# Patient Record
Sex: FEMALE | Race: WHITE | Employment: OTHER | URBAN - METROPOLITAN AREA
[De-identification: names, ages, dates, MRNs, and addresses within clinical notes are randomized per-mention and may not be internally consistent; named-entity substitution may affect disease eponyms.]

---

## 2017-01-10 DIAGNOSIS — F41.9 ANXIETY: Primary | ICD-10-CM

## 2017-01-10 NOTE — TELEPHONE ENCOUNTER
citalopram (CELEXA) 20 MG tablet     Last Written Prescription Date: 7/14/2016  Last Fill Quantity: 90 tablet, # refills: 1  Last Office Visit with [facility] primary care provider:  5/25/2016        Last PHQ-9 score on record=   PHQ-9 SCORE 5/25/2016   Total Score -   Total Score 10

## 2017-01-11 RX ORDER — CITALOPRAM HYDROBROMIDE 20 MG/1
20 TABLET ORAL DAILY
Qty: 90 TABLET | Refills: 1 | Status: SHIPPED
Start: 2017-01-11 | End: 2017-06-05

## 2017-01-11 NOTE — TELEPHONE ENCOUNTER
Dx for medication is anxiety. No depression on problem list. Prescription approved per [facility] Refill Protocol.  Albania Frankel RN, BSN  [facility]

## 2017-05-11 DIAGNOSIS — J44.9 COPD (CHRONIC OBSTRUCTIVE PULMONARY DISEASE) (H): ICD-10-CM

## 2017-05-11 NOTE — TELEPHONE ENCOUNTER
levofloxacin (LEVAQUIN) 500 MG tablet           Last Written Prescription Date: 5-25-16  Last Fill Quantity: 7, # refills: 0    Last Office Visit with [facility] or [facility] prescribing provider:  5-25-16   Future Office Visit:    Next 5 appointments (look out 90 days)     Jun 05, 2017  9:40 AM CDT   PHYSICAL with Karen Weiler, MD   [facility] ([facility])    [address]  [facility] [id number] [phone]                   Lab Results   Component Value Date    WBC 5.0 05/25/2016     Lab Results   Component Value Date    RBC 5.03 05/25/2016     Lab Results   Component Value Date    HGB 14.3 05/25/2016     Lab Results   Component Value Date    HCT 43.1 05/25/2016     No components found for: MCT  Lab Results   Component Value Date    MCV 86 05/25/2016     Lab Results   Component Value Date    MCH 28.4 05/25/2016     Lab Results   Component Value Date    MCHC 33.2 05/25/2016     Lab Results   Component Value Date    RDW 15.5 05/25/2016     Lab Results   Component Value Date     05/25/2016     Lab Results   Component Value Date    AST 20 05/25/2016     Lab Results   Component Value Date    ALT 20 05/25/2016     Creatinine   Date Value Ref Range Status   05/25/2016 0.80 0.52 - 1.04 mg/dL Final   ]

## 2017-05-21 DIAGNOSIS — I10 ESSENTIAL HYPERTENSION WITH GOAL BLOOD PRESSURE LESS THAN 140/90: ICD-10-CM

## 2017-05-22 RX ORDER — LOSARTAN POTASSIUM AND HYDROCHLOROTHIAZIDE 12.5; 5 MG/1; MG/1
TABLET ORAL
Qty: 90 TABLET | Refills: 0 | OUTPATIENT
Start: 2017-05-22

## 2017-05-22 NOTE — TELEPHONE ENCOUNTER
losartan-hydrochlorothiazide (HYZAAR) 50-12.5 MG per tablet      Last Written Prescription Date: 11/23/2016  Last Fill Quantity: 90 tablet, # refills: 1  Last Office Visit with [facility], [facility] or [facility] prescribing provider: 9/29/2016  Next 5 appointments (look out 90 days)     Jun 05, 2017  9:40 AM CDT   PHYSICAL with Karen Weiler, MD   [facility] ([facility]    [address] [facility] [id number] [phone]                   Potassium   Date Value Ref Range Status   05/25/2016 4.1 3.4 - 5.3 mmol/L Final     Creatinine   Date Value Ref Range Status   05/25/2016 0.80 0.52 - 1.04 mg/dL Final     BP Readings from Last 3 Encounters:   05/25/16 138/88   05/13/15 138/68   05/05/15 102/56

## 2017-05-26 NOTE — TELEPHONE ENCOUNTER
Please ask patient if she is currently having symptoms of infection, or if she just wants this medication on-hand in the event of a COPD exacerbation (it looks like she has gotten prescriptions for antibiotic and prednisone in the past for emergency use when traveling).    Afsaneh Boyle PA-C

## 2017-06-05 ENCOUNTER — OFFICE VISIT (OUTPATIENT)
Dept: FAMILY MEDICINE | Facility: CLINIC | Age: 77
End: 2017-06-05
Payer: COMMERCIAL

## 2017-06-05 VITALS
WEIGHT: 138 LBS | DIASTOLIC BLOOD PRESSURE: 80 MMHG | HEIGHT: 70 IN | HEART RATE: 86 BPM | TEMPERATURE: 98.4 F | SYSTOLIC BLOOD PRESSURE: 134 MMHG | OXYGEN SATURATION: 95 % | BODY MASS INDEX: 19.76 KG/M2

## 2017-06-05 DIAGNOSIS — Z13.6 CARDIOVASCULAR SCREENING; LDL GOAL LESS THAN 130: ICD-10-CM

## 2017-06-05 DIAGNOSIS — G25.81 RESTLESS LEG SYNDROME: ICD-10-CM

## 2017-06-05 DIAGNOSIS — F41.9 ANXIETY: ICD-10-CM

## 2017-06-05 DIAGNOSIS — J44.9 CHRONIC OBSTRUCTIVE PULMONARY DISEASE, UNSPECIFIED COPD TYPE (H): ICD-10-CM

## 2017-06-05 DIAGNOSIS — R19.5 LOOSE STOOLS: ICD-10-CM

## 2017-06-05 DIAGNOSIS — Z00.00 MEDICARE ANNUAL WELLNESS VISIT, SUBSEQUENT: Primary | ICD-10-CM

## 2017-06-05 DIAGNOSIS — I10 ESSENTIAL HYPERTENSION WITH GOAL BLOOD PRESSURE LESS THAN 140/90: ICD-10-CM

## 2017-06-05 DIAGNOSIS — Z23 NEED FOR VACCINATION WITH 13-POLYVALENT PNEUMOCOCCAL CONJUGATE VACCINE: ICD-10-CM

## 2017-06-05 DIAGNOSIS — R41.3 MEMORY PROBLEM: ICD-10-CM

## 2017-06-05 DIAGNOSIS — K21.00 REFLUX ESOPHAGITIS: ICD-10-CM

## 2017-06-05 LAB
ALBUMIN SERPL-MCNC: 4.1 G/DL (ref 3.4–5)
ALP SERPL-CCNC: 53 U/L (ref 40–150)
ALT SERPL W P-5'-P-CCNC: 19 U/L (ref 0–50)
ANION GAP SERPL CALCULATED.3IONS-SCNC: 9 MMOL/L (ref 3–14)
AST SERPL W P-5'-P-CCNC: 18 U/L (ref 0–45)
BASOPHILS # BLD AUTO: 0 10E9/L (ref 0–0.2)
BASOPHILS NFR BLD AUTO: 0.6 %
BILIRUB SERPL-MCNC: 0.6 MG/DL (ref 0.2–1.3)
BUN SERPL-MCNC: 15 MG/DL (ref 7–30)
CALCIUM SERPL-MCNC: 9 MG/DL (ref 8.5–10.1)
CHLORIDE SERPL-SCNC: 106 MMOL/L (ref 94–109)
CHOLEST SERPL-MCNC: 214 MG/DL
CO2 SERPL-SCNC: 27 MMOL/L (ref 20–32)
CREAT SERPL-MCNC: 0.84 MG/DL (ref 0.52–1.04)
DIFFERENTIAL METHOD BLD: ABNORMAL
EOSINOPHIL # BLD AUTO: 0.2 10E9/L (ref 0–0.7)
EOSINOPHIL NFR BLD AUTO: 3.6 %
ERYTHROCYTE [DISTWIDTH] IN BLOOD BY AUTOMATED COUNT: 13.5 % (ref 10–15)
FOLATE SERPL-MCNC: 51 NG/ML
GFR SERPL CREATININE-BSD FRML MDRD: 66 ML/MIN/1.7M2
GLUCOSE SERPL-MCNC: 95 MG/DL (ref 70–99)
HCT VFR BLD AUTO: 46.7 % (ref 35–47)
HDLC SERPL-MCNC: 65 MG/DL
HGB BLD-MCNC: 16.1 G/DL (ref 11.7–15.7)
IRON SATN MFR SERPL: 33 % (ref 15–46)
IRON SERPL-MCNC: 108 UG/DL (ref 35–180)
LDLC SERPL CALC-MCNC: 128 MG/DL
LYMPHOCYTES # BLD AUTO: 1.4 10E9/L (ref 0.8–5.3)
LYMPHOCYTES NFR BLD AUTO: 29.8 %
MCH RBC QN AUTO: 30.4 PG (ref 26.5–33)
MCHC RBC AUTO-ENTMCNC: 34.5 G/DL (ref 31.5–36.5)
MCV RBC AUTO: 88 FL (ref 78–100)
MONOCYTES # BLD AUTO: 0.5 10E9/L (ref 0–1.3)
MONOCYTES NFR BLD AUTO: 10.5 %
NEUTROPHILS # BLD AUTO: 2.7 10E9/L (ref 1.6–8.3)
NEUTROPHILS NFR BLD AUTO: 55.5 %
NONHDLC SERPL-MCNC: 149 MG/DL
PLATELET # BLD AUTO: 293 10E9/L (ref 150–450)
POTASSIUM SERPL-SCNC: 4 MMOL/L (ref 3.4–5.3)
PROT SERPL-MCNC: 7.1 G/DL (ref 6.8–8.8)
RBC # BLD AUTO: 5.3 10E12/L (ref 3.8–5.2)
SODIUM SERPL-SCNC: 142 MMOL/L (ref 133–144)
TIBC SERPL-MCNC: 331 UG/DL (ref 240–430)
TRIGL SERPL-MCNC: 105 MG/DL
TSH SERPL DL<=0.005 MIU/L-ACNC: 1.16 MU/L (ref 0.4–4)
VIT B12 SERPL-MCNC: 191 PG/ML (ref 193–986)
WBC # BLD AUTO: 4.8 10E9/L (ref 4–11)

## 2017-06-05 PROCEDURE — 82607 VITAMIN B-12: CPT | Performed by: FAMILY MEDICINE

## 2017-06-05 PROCEDURE — 80061 LIPID PANEL: CPT | Performed by: FAMILY MEDICINE

## 2017-06-05 PROCEDURE — 83540 ASSAY OF IRON: CPT | Performed by: FAMILY MEDICINE

## 2017-06-05 PROCEDURE — 82746 ASSAY OF FOLIC ACID SERUM: CPT | Performed by: FAMILY MEDICINE

## 2017-06-05 PROCEDURE — 80050 GENERAL HEALTH PANEL: CPT | Performed by: FAMILY MEDICINE

## 2017-06-05 PROCEDURE — 99397 PER PM REEVAL EST PAT 65+ YR: CPT | Mod: 25 | Performed by: FAMILY MEDICINE

## 2017-06-05 PROCEDURE — 83550 IRON BINDING TEST: CPT | Performed by: FAMILY MEDICINE

## 2017-06-05 PROCEDURE — 36415 COLL VENOUS BLD VENIPUNCTURE: CPT | Performed by: FAMILY MEDICINE

## 2017-06-05 PROCEDURE — 90471 IMMUNIZATION ADMIN: CPT | Performed by: FAMILY MEDICINE

## 2017-06-05 PROCEDURE — 90670 PCV13 VACCINE IM: CPT | Performed by: FAMILY MEDICINE

## 2017-06-05 RX ORDER — PRAMIPEXOLE DIHYDROCHLORIDE 0.25 MG/1
0.25 TABLET ORAL 2 TIMES DAILY
Qty: 180 TABLET | Refills: 1 | Status: SHIPPED | OUTPATIENT
Start: 2017-06-05 | End: 2017-12-03

## 2017-06-05 RX ORDER — LEVOFLOXACIN 500 MG/1
500 TABLET, FILM COATED ORAL DAILY
Qty: 7 TABLET | Refills: 0 | Status: SHIPPED | OUTPATIENT
Start: 2017-06-05 | End: 2018-06-07

## 2017-06-05 RX ORDER — CITALOPRAM HYDROBROMIDE 20 MG/1
20 TABLET ORAL DAILY
Qty: 90 TABLET | Refills: 1 | Status: SHIPPED | OUTPATIENT
Start: 2017-06-05 | End: 2017-12-17

## 2017-06-05 RX ORDER — GABAPENTIN 300 MG/1
300 CAPSULE ORAL AT BEDTIME
Qty: 90 CAPSULE | Refills: 1 | Status: SHIPPED | OUTPATIENT
Start: 2017-06-05 | End: 2017-11-12

## 2017-06-05 RX ORDER — PREDNISONE 10 MG/1
TABLET ORAL
Qty: 30 TABLET | Refills: 2 | Status: SHIPPED | OUTPATIENT
Start: 2017-06-05 | End: 2018-06-07

## 2017-06-05 RX ORDER — LOSARTAN POTASSIUM AND HYDROCHLOROTHIAZIDE 12.5; 5 MG/1; MG/1
1 TABLET ORAL DAILY
Qty: 90 TABLET | Refills: 1 | Status: SHIPPED | OUTPATIENT
Start: 2017-06-05 | End: 2017-12-03

## 2017-06-05 NOTE — NURSING NOTE
"Chief Complaint   Patient presents with     Wellness Visit       Initial /80  Pulse 86  Temp 98.4  F (36.9  C) (Oral)  Ht 5' 9.9\" (1.775 m)  Wt 138 lb (62.6 kg)  SpO2 95%  BMI 19.86 kg/m2 Estimated body mass index is 19.86 kg/(m^2) as calculated from the following:    Height as of this encounter: 5' 9.9\" (1.775 m).    Weight as of this encounter: 138 lb (62.6 kg).  Medication Reconciliation: complete   Cherry Jeong Certified Medical Assistant      "

## 2017-06-05 NOTE — PATIENT INSTRUCTIONS

## 2017-06-05 NOTE — MR AVS SNAPSHOT
After Visit Summary   6/5/2017    Katty Hayward    MRN: [id number]           Patient Information     Date Of Birth          1940        Visit Information        Provider Department      6/5/2017 9:40 AM Weiler, Karen, MD [facility]        Today's Diagnoses     Medicare annual wellness visit, subsequent    -  1    Loose stools        CARDIOVASCULAR SCREENING; LDL GOAL LESS THAN 130        Chronic obstructive pulmonary disease, unspecified COPD type (H)        Memory problem        Restless leg syndrome        Essential hypertension with goal blood pressure less than 140/90        Anxiety        Reflux esophagitis        Need for vaccination with 13-polyvalent pneumococcal conjugate vaccine          Care Instructions      Preventive Health Recommendations    Female Ages 65 +    Yearly exam:     See your health care provider every year in order to  o Review health changes.   o Discuss preventive care.    o Review your medicines if your doctor has prescribed any.      You no longer need a yearly Pap test unless you've had an abnormal Pap test in the past 10 years. If you have vaginal symptoms, such as bleeding or discharge, be sure to talk with your provider about a Pap test.      Every 1 to 2 years, have a mammogram.  If you are over 69, talk with your health care provider about whether or not you want to continue having screening mammograms.      Every 10 years, have a colonoscopy. Or, have a yearly FIT test (stool test). These exams will check for colon cancer.       Have a cholesterol test every 5 years, or more often if your doctor advises it.       Have a diabetes test (fasting glucose) every three years. If you are at risk for diabetes, you should have this test more often.       At age 65, have a bone density scan (DEXA) to check for osteoporosis (brittle bone disease).    Shots:    Get a flu shot each year.    Get a tetanus shot every 10 years.    Talk to your doctor about your  pneumonia vaccines. There are now two you should receive - Pneumovax (PPSV 23) and Prevnar (PCV 13).    Talk to your doctor about the shingles vaccine.    Talk to your doctor about the hepatitis B vaccine.    Nutrition:     Eat at least 5 servings of fruits and vegetables each day.      Eat whole-grain bread, whole-wheat pasta and brown rice instead of white grains and rice.      Talk to your provider about Calcium and Vitamin D.     Lifestyle    Exercise at least 150 minutes a week (30 minutes a day, 5 days a week). This will help you control your weight and prevent disease.      Limit alcohol to one drink per day.      No smoking.       Wear sunscreen to prevent skin cancer.       See your dentist twice a year for an exam and cleaning.      See your eye doctor every 1 to 2 years to screen for conditions such as glaucoma, macular degeneration and cataracts.    Try taking Immodium as needed.  For example, when you go out to eat.           Follow-ups after your visit        Future tests that were ordered for you today     Open Future Orders        Priority Expected Expires Ordered    Enteric Bacteria and Virus Panel by NATHANIEL Stool Routine  6/5/2018 6/5/2017    Clostridium difficile Toxin B PCR Routine  12/5/2017 6/5/2017            Who to contact     If you have questions or need follow up information about today's clinic visit or your schedule please contact [facility] directly at [phone].  Normal or non-critical lab and imaging results will be communicated to you by MyChart, letter or phone within 4 business days after the clinic has received the results. If you do not hear from us within 7 days, please contact the clinic through MyChart or phone. If you have a critical or abnormal lab result, we will notify you by phone as soon as possible.  Submit refill requests through [employer] or call your pharmacy and they will forward the refill request to us. Please allow 3 business days for your refill to be  "completed.          Additional Information About Your Visit        [employer] Information     [employer] gives you secure access to your electronic health record. If you see a primary care provider, you can also send messages to your care team and make appointments. If you have questions, please call your primary care clinic.  If you do not have a primary care provider, please call [phone] and they will assist you.        Care EveryWhere ID     This is your Care EveryWhere ID. This could be used by other organizations to access your [place] medical records  [id number]        Your Vitals Were     Pulse Temperature Height Pulse Oximetry BMI (Body Mass Index)       86 98.4  F (36.9  C) (Oral) 5' 9.9\" (1.775 m) 95% 19.86 kg/m2        Blood Pressure from Last 3 Encounters:   06/05/17 134/80   05/25/16 138/88   05/13/15 138/68    Weight from Last 3 Encounters:   06/05/17 138 lb (62.6 kg)   09/13/16 135 lb (61.2 kg)   09/08/16 135 lb (61.2 kg)              We Performed the Following     CBC with platelets and differential     Comprehensive metabolic panel (BMP + Alb, Alk Phos, ALT, AST, Total. Bili, TP)     Folate     Iron and iron binding capacity     Lipid panel reflex to direct LDL     PNEUMOCOCCAL CONJ VACCINE 13 VALENT IM     TSH with free T4 reflex     Vitamin B12          Today's Medication Changes          These changes are accurate as of: 6/5/17 10:33 AM.  If you have any questions, ask your nurse or doctor.               Start taking these medicines.        Dose/Directions    gabapentin 300 MG capsule   Commonly known as:  NEURONTIN   Used for:  Restless leg syndrome   Started by:  Weiler, Karen, MD        Dose:  300 mg   Take 1 capsule (300 mg) by mouth At Bedtime   Quantity:  90 capsule   Refills:  1       levofloxacin 500 MG tablet   Commonly known as:  LEVAQUIN   Used for:  Chronic obstructive pulmonary disease, unspecified COPD type (H)   Started by:  Weiler, Karen, MD        Dose:  500 mg   Take 1 tablet " (500 mg) by mouth daily   Quantity:  7 tablet   Refills:  0         Stop taking these medicines if you haven't already. Please contact your care team if you have questions.     budesonide-formoterol 80-4.5 MCG/ACT Inhaler   Commonly known as:  SYMBICORT   Stopped by:  Weiler, Karen, MD           clonazePAM 0.5 MG tablet   Commonly known as:  klonoPIN   Stopped by:  Weiler, Karen, MD                Where to get your medicines      Some of these will need a paper prescription and others can be bought over the counter.  Ask your nurse if you have questions.     Bring a paper prescription for each of these medications     citalopram 20 MG tablet    gabapentin 300 MG capsule    levofloxacin 500 MG tablet    losartan-hydrochlorothiazide 50-12.5 MG per tablet    omeprazole 20 MG CR capsule    pramipexole 0.25 MG tablet    predniSONE 10 MG tablet                Primary Care Provider Office Phone # Fax #    Karen Weiler, [phone]       [facility] [address] [address]        Thank you!     Thank you for choosing [facility]  for your care. Our goal is always to provide you with excellent care. Hearing back from our patients is one way we can continue to improve our services. Please take a few minutes to complete the written survey that you may receive in the mail after your visit with us. Thank you!             Your Updated Medication List - Protect others around you: Learn how to safely use, store and throw away your medicines at www.disposemymeds.org.          This list is accurate as of: 6/5/17 10:33 AM.  Always use your most recent med list.                   Brand Name Dispense Instructions for use    aspirin 81 MG tablet     100    ONE DAILY       BREO ELLIPTA 100-25 MCG/INH oral inhaler   Generic drug:  fluticasone-vilanterol          calcium 600 MG tablet     100 tablet    Take 1 tablet by mouth daily.       citalopram 20 MG tablet    celeXA    90 tablet     Take 1 tablet (20 mg) by mouth daily       gabapentin 300 MG capsule    NEURONTIN    90 capsule    Take 1 capsule (300 mg) by mouth At Bedtime       * Ipratropium-Albuterol  MCG/ACT inhaler    COMBIVENT RESPIMAT    3 Inhaler    Inhale 1 puff into the lungs 4 times daily Not to exceed 6 doses per day.       * ipratropium - albuterol 0.5 mg/2.5 mg/3 mL 0.5-2.5 (3) MG/3ML neb solution    DUONEB    90 vial    Take 1 vial (3 mLs) by nebulization every 6 hours as needed for shortness of breath / dyspnea or wheezing       levofloxacin 500 MG tablet    LEVAQUIN    7 tablet    Take 1 tablet (500 mg) by mouth daily       losartan-hydrochlorothiazide 50-12.5 MG per tablet    HYZAAR    90 tablet    Take 1 tablet by mouth daily       Multiple vitamin  s Tabs          omeprazole 20 MG CR capsule    priLOSEC    180 capsule    Take 1 capsule (20 mg) by mouth 2 times daily       order for DME     1 Device    Nebulizer Machine       * pramipexole 0.5 MG tablet    MIRAPEX    90 tablet    Take 1 tablet (0.5 mg) by mouth At Bedtime       * pramipexole 0.25 MG tablet    MIRAPEX    180 tablet    Take 1 tablet (0.25 mg) by mouth 2 times daily Must be seen in clinic for further refills       predniSONE 10 MG tablet    DELTASONE    30 tablet    Take 4 Tablets by mouth every 2 days then decrease by 1 tablet every 3 rd day until off       vitamin D 2000 UNITS tablet     100 tablet    Take 1 tablet by mouth daily.       * Notice:  This list has 4 medication(s) that are the same as other medications prescribed for you. Read the directions carefully, and ask your doctor or other care provider to review them with you.

## 2017-06-07 DIAGNOSIS — E53.8 VITAMIN B12 DEFICIENCY (NON ANEMIC): Primary | ICD-10-CM

## 2017-06-07 RX ORDER — CYANOCOBALAMIN 1000 UG/ML
1 INJECTION, SOLUTION INTRAMUSCULAR; SUBCUTANEOUS
Qty: 1 ML | Refills: 11 | OUTPATIENT
Start: 2017-06-07 | End: 2018-06-07

## 2017-06-09 RX ORDER — LEVOFLOXACIN 500 MG/1
TABLET, FILM COATED ORAL
Qty: 7 TABLET | Refills: 0 | OUTPATIENT
Start: 2017-06-09

## 2017-06-19 ENCOUNTER — ALLIED HEALTH/NURSE VISIT (OUTPATIENT)
Dept: NURSING | Facility: CLINIC | Age: 77
End: 2017-06-19
Payer: COMMERCIAL

## 2017-06-19 DIAGNOSIS — E53.8 VITAMIN B12 DEFICIENCY (NON ANEMIC): Primary | ICD-10-CM

## 2017-06-19 PROCEDURE — 96372 THER/PROPH/DIAG INJ SC/IM: CPT

## 2017-07-25 ENCOUNTER — TELEPHONE (OUTPATIENT)
Dept: FAMILY MEDICINE | Facility: CLINIC | Age: 77
End: 2017-07-25

## 2017-07-25 DIAGNOSIS — R19.5 LOOSE STOOLS: ICD-10-CM

## 2017-07-25 LAB
C DIFF TOX B STL QL: NORMAL
CAMPYLOBACTER GROUP BY NAT: NOT DETECTED
ENTERIC PATHOGEN COMMENT: NORMAL
NOROVIRUS I AND II BY NAT: NOT DETECTED
ROTAVIRUS A BY NAT: NOT DETECTED
SALMONELLA SPECIES BY NAT: NOT DETECTED
SHIGA TOXIN 1 GENE BY NAT: NOT DETECTED
SHIGA TOXIN 2 GENE BY NAT: NOT DETECTED
SHIGELLA SP+EIEC IPAH STL QL NAA+PROBE: NOT DETECTED
SPECIMEN SOURCE: NORMAL
VIBRIO GROUP BY NAT: NOT DETECTED
YERSINIA ENTEROCOLITICA BY NAT: NOT DETECTED

## 2017-07-25 PROCEDURE — 87493 C DIFF AMPLIFIED PROBE: CPT | Performed by: FAMILY MEDICINE

## 2017-07-25 PROCEDURE — 87506 IADNA-DNA/RNA PROBE TQ 6-11: CPT | Performed by: FAMILY MEDICINE

## 2017-07-25 NOTE — TELEPHONE ENCOUNTER
Name of caller: Lillie  Relationship of Patient: Self    Reason for Call: Patient came into clinic to ask how often she should be getting her B12 shot and had questions about it as well.     Best phone number to reach pt at is: [phone]  Ok to leave a message with medical info? Yes    Pharmacy preferred (if calling for a refill): ROBERT Neumann  [facility]-Patient Representative

## 2017-07-25 NOTE — TELEPHONE ENCOUNTER
Routing to TC- Lab note:   Her Vitamin B12 level also came back low. Dr. Weiler recommends starting monthly Vitamin B12 injections in clinic to treat the deficiency. Please assist with scheduling appt on MA schedule.  Silvia Lackey RN- Triage [employer]

## 2017-07-26 NOTE — TELEPHONE ENCOUNTER
Called number below and left detailed message for pt.  Asked her to call when ready to schedule her MA only appt.  Jacquelyn Motley

## 2017-07-31 ENCOUNTER — ALLIED HEALTH/NURSE VISIT (OUTPATIENT)
Dept: NURSING | Facility: CLINIC | Age: 77
End: 2017-07-31
Payer: COMMERCIAL

## 2017-07-31 DIAGNOSIS — E53.8 VITAMIN B12 DEFICIENCY (NON ANEMIC): Primary | ICD-10-CM

## 2017-07-31 PROCEDURE — 96372 THER/PROPH/DIAG INJ SC/IM: CPT

## 2017-09-05 ENCOUNTER — ALLIED HEALTH/NURSE VISIT (OUTPATIENT)
Dept: NURSING | Facility: CLINIC | Age: 77
End: 2017-09-05
Payer: COMMERCIAL

## 2017-09-05 DIAGNOSIS — E53.8 VITAMIN B12 DEFICIENCY (NON ANEMIC): Primary | ICD-10-CM

## 2017-09-05 PROCEDURE — 96372 THER/PROPH/DIAG INJ SC/IM: CPT

## 2017-09-26 ENCOUNTER — TELEPHONE (OUTPATIENT)
Dept: FAMILY MEDICINE | Facility: CLINIC | Age: 77
End: 2017-09-26

## 2017-09-26 DIAGNOSIS — E53.8 VITAMIN B12 DEFICIENCY (NON ANEMIC): Primary | ICD-10-CM

## 2017-09-26 NOTE — TELEPHONE ENCOUNTER
Please call  Patient and let her know usually patients need to be on B12 shots indefinitely. She needs to take them in [place].        Karen Weiler, MD

## 2017-09-26 NOTE — TELEPHONE ENCOUNTER
Reason for Call:  Other Pt Questions    Detailed comments: Pt called asking about her B12 shots, how long she should continue them. If she would need to do so while she is in [place] for the winter.      Phone Number Patient can be reached at: Home number on file [phone] (home)    Best Time: anytime    Can we leave a detailed message on this number? YES    Call taken on 9/26/2017 at 2:32 PM by Soraya Donato

## 2017-09-27 RX ORDER — CYANOCOBALAMIN 1000 UG/ML
1 INJECTION, SOLUTION INTRAMUSCULAR; SUBCUTANEOUS
Qty: 1 ML | Refills: 11 | Status: SHIPPED | OUTPATIENT
Start: 2017-09-27 | End: 2018-06-29

## 2017-09-27 NOTE — TELEPHONE ENCOUNTER
I called and spoke with Katty information below given--wants sent to [facility]. Leaving October 24th. Katty wants the RX sent in now. She will come to the clinic here on 10/5/17 and get her B12 injection. [facility] with [place] address so our sending now should not be an issue.     Her daughter is RN and will be able to give her injections in [place].     Dr. Weiler please fill RX. Thank you,     Zuleyma Crain R.N.

## 2017-10-03 ENCOUNTER — TRANSFERRED RECORDS (OUTPATIENT)
Dept: HEALTH INFORMATION MANAGEMENT | Facility: CLINIC | Age: 77
End: 2017-10-03

## 2017-10-05 ENCOUNTER — ALLIED HEALTH/NURSE VISIT (OUTPATIENT)
Dept: NURSING | Facility: CLINIC | Age: 77
End: 2017-10-05
Payer: COMMERCIAL

## 2017-10-05 ENCOUNTER — TELEPHONE (OUTPATIENT)
Dept: FAMILY MEDICINE | Facility: CLINIC | Age: 77
End: 2017-10-05

## 2017-10-05 DIAGNOSIS — E53.8 VITAMIN B12 DEFICIENCY (NON ANEMIC): Primary | ICD-10-CM

## 2017-10-05 PROCEDURE — 96372 THER/PROPH/DIAG INJ SC/IM: CPT

## 2017-10-05 NOTE — TELEPHONE ENCOUNTER
Pt as in clinic for B12 injection and let the MA know she was given her vials of B12, but no needles to inject the medication.  Can we send rx for needles to mail order pharmacy?  See pended pahramcy please.  Jacquelyn Motley

## 2017-11-12 DIAGNOSIS — G25.81 RESTLESS LEG SYNDROME: ICD-10-CM

## 2017-11-13 NOTE — TELEPHONE ENCOUNTER
gabapentin      Last Written Prescription Date:  6/5/17  Last Fill Quantity: 90,   # refills: 1  Last Office visit: 6/5/17      Routing refill request to provider for review/approval because:  Drug not on the [facility], P or [facility] refill protocol or controlled substance  Albania Frankel RN, BSN  [facility]

## 2017-11-15 RX ORDER — GABAPENTIN 300 MG/1
CAPSULE ORAL
Qty: 90 CAPSULE | Refills: 1 | Status: SHIPPED | OUTPATIENT
Start: 2017-11-15 | End: 2018-06-29

## 2017-12-03 DIAGNOSIS — G25.81 RESTLESS LEG SYNDROME: ICD-10-CM

## 2017-12-03 DIAGNOSIS — I10 ESSENTIAL HYPERTENSION WITH GOAL BLOOD PRESSURE LESS THAN 140/90: ICD-10-CM

## 2017-12-04 RX ORDER — LOSARTAN POTASSIUM AND HYDROCHLOROTHIAZIDE 12.5; 5 MG/1; MG/1
TABLET ORAL
Qty: 30 TABLET | Refills: 0 | Status: SHIPPED | OUTPATIENT
Start: 2017-12-04 | End: 2018-06-29

## 2017-12-04 RX ORDER — PRAMIPEXOLE DIHYDROCHLORIDE 0.25 MG/1
TABLET ORAL
Qty: 60 TABLET | Refills: 0 | Status: SHIPPED | OUTPATIENT
Start: 2017-12-04

## 2017-12-04 NOTE — TELEPHONE ENCOUNTER
Requested Prescriptions   Pending Prescriptions Disp Refills     pramipexole (MIRAPEX) 0.25 MG tablet [Pharmacy Med Name: PRAMIPEXOLE DIHYDRO TABS 0.25MG]  Last Written Prescription Date:  6/5/2017  Last Fill Quantity: 180 tablet,  # refills: 1   Last Office Visit with [facility], [facility] or [facility] prescribing provider:  6/5/2017   Future Office Visit:      180 tablet 1     Sig: TAKE 1 TABLET TWICE A DAY (NEED TO BE SEEN FOR FURTHER REFILLS)    Antiparkinson's Agents Protocol Passed    12/3/2017  4:30 AM       Passed - Blood pressure under 140/90    BP Readings from Last 3 Encounters:   06/05/17 134/80   05/25/16 138/88   05/13/15 138/68          Passed - Recent or future visit with authorizing provider's specialty    Patient had office visit in the last year or has a visit in the next 30 days with authorizing provider.  See chart review.          Passed - Patient is age 18 or older       Passed - No active pregnancy on record       Passed - No positive pregnancy test in the past 12 months                losartan-hydrochlorothiazide (HYZAAR) 50-12.5 MG per tablet [Pharmacy Med Name: LOSARTAN/HCTZ TABS 50/12.5]  Last Written Prescription Date:  6/5/2017  Last Fill Quantity: 90 tablet,  # refills: 1   Last Office Visit with [facility], [facility] or [facility] prescribing provider:  6/5/2017   Future Office Visit:      90 tablet 1     Sig: TAKE 1 TABLET DAILY    Angiotensin-II Receptors Passed    12/3/2017  4:30 AM       Passed - Blood pressure under 140/90 in past 12 months.    BP Readings from Last 3 Encounters:   06/05/17 134/80   05/25/16 138/88   05/13/15 138/68                Passed - Recent or future visit with authorizing provider's specialty    Patient had office visit in the last year or has a visit in the next 30 days with authorizing provider.  See chart review.              Passed - Patient is age 18 or older       Passed - No active pregnancy on record       Passed - Normal serum creatinine on file in past 12 months    Recent  Labs   Lab Test  06/05/17   1044   CR  0.84            Passed - Normal serum potassium on file in past 12 months    Recent Labs   Lab Test  06/05/17   1044   POTASSIUM  4.0                   Passed - No positive pregnancy test in past 12 months

## 2017-12-04 NOTE — TELEPHONE ENCOUNTER
Medication is being filled for 1 time refill only due to:  Patient needs to be seen because follow up and establish care.     Will route to  to call patient to set up office visit to establish care and for continued refills. Zuleyma Crain R.N.

## 2017-12-05 NOTE — TELEPHONE ENCOUNTER
Reason for Call:  Other returning call    Detailed comments: Pt calling stating not going to get mirapex filled and will see Dr Emanuel Carver as her pcp    Phone Number Patient can be reached at: Cell number on file:    Telephone Information:   Mobile [phone]       Best Time: anytime     Can we leave a detailed message on this number? YES    Call taken on 12/5/2017 at 3:58 PM by Jessie Mendenhall

## 2017-12-05 NOTE — TELEPHONE ENCOUNTER
Called [phone] and spoke with pt.  She is in [place] for the winter.  She does not have anyone there that she sees.  Can someone else fill this?  She will be back in [place] in April.  Please advise.  Jacquelyn Motley

## 2017-12-12 ENCOUNTER — TELEPHONE (OUTPATIENT)
Dept: FAMILY MEDICINE | Facility: CLINIC | Age: 77
End: 2017-12-12

## 2017-12-12 NOTE — TELEPHONE ENCOUNTER
and patient called to ask if patient can establish care with Dr. Carver.  RN advised that she will need to schedule office visit to establish care when they are back in [place].  They verbalized understanding and agreed with plan.    FAIZAN Gold, RN, N  [facility]) [phone]

## 2017-12-17 DIAGNOSIS — F41.9 ANXIETY: ICD-10-CM

## 2017-12-18 RX ORDER — CITALOPRAM HYDROBROMIDE 20 MG/1
TABLET ORAL
Qty: 90 TABLET | Refills: 0 | Status: SHIPPED | OUTPATIENT
Start: 2017-12-18 | End: 2018-06-29

## 2017-12-18 NOTE — TELEPHONE ENCOUNTER
Medication is being filled for 1 time refill only due to:  Patient needs to be seen because needs anxiety follow up and to establish care.   Albania Frankel RN, BSN  [facility]

## 2017-12-18 NOTE — TELEPHONE ENCOUNTER
Requested Prescriptions   Pending Prescriptions Disp Refills     citalopram (CELEXA) 20 MG tablet [Pharmacy Med Name: CITALOPRAM HBR TABS 20MG]  Last Written Prescription Date:  6/5/2017  Last Fill Quantity: 90 tablet,  # refills: 1   Last Office Visit with [facility], [facility] or [facility] prescribing provider:  6/5/2017 (Weiler)   Future Office Visit:     90 tablet 1     Sig: TAKE 1 TABLET DAILY    SSRIs Protocol Passed    12/17/2017  5:13 AM  PHQ-9 SCORE 9/29/2011 10/23/2013 5/25/2016   Total Score 0 1 -   Total Score - - 10     ILDEFONSO-7 SCORE 10/23/2013 11/26/2014 5/25/2016   Total Score 0 16 -   Total Score - 16 -   Total Score - - 6            Passed - Recent or future visit with authorizing provider    Patient had office visit in the last year or has a visit in the next 30 days with authorizing provider.  See chart review.          Passed - Medication is NOT Bupropion    If the medication is Bupropion (Wellbutrin), and the patient is taking for smoking cessation; OK to refill.         Passed - Patient is age 18 or older       Passed - No active pregnancy on record       Passed - No positive pregnancy test in last 12 months

## 2018-06-06 NOTE — PATIENT INSTRUCTIONS
Shingrix    Consider mammogram, DEXA, Colonoscopy    Cetaphil/cerave skin lotion      [facility]                        To reach your care team during and after hours:   [phone]  To reach our pharmacy:        [phone]    Clinic Hours                        Our clinic hours are:    Monday   7:30 am to 7:00 pm                  Tuesday through Friday 7:30 am to 5:00 pm                             Saturday   8:00 am to 12:00 pm      Sunday   Closed      Pharmacy Hours                        Our pharmacy hours are:    Monday   8:30 am to 7:00 pm       Tuesday to Friday  8:30 am to 6:00 pm                       Saturday    9:00 am to 1:00 pm              Sunday    Closed              There is also information available at our web site:  [email or web address]    If your provider ordered any lab tests and you do not receive the results within 10 business days, please call the clinic.    If you need a medication refill please contact your pharmacy.  Please allow 2-3 business days for your refill to be completed.    Our clinic offers telephone visits and e visits.  Please ask one of your team members to explain more.      Use Glassdoor (secure email communication and access to your chart) to send your primary care provider a message or make an appointment. Ask someone on your Team how to sign up for Glassdoor.  Immunizations                      Immunization History   Administered Date(s) Administered     HepB-Adult 01/01/1990, 02/01/1990, 06/01/1990     Influenza (High Dose) 3 valent vaccine 10/03/2011, 11/12/2012, 10/23/2013     Influenza (IIV3) PF 11/03/2004, 10/31/2005, 10/08/2006, 11/01/2007, 11/04/2008, 12/02/2009, 09/21/2010     Influenza Vaccine IM 3yrs+ 4 Valent IIV4 10/01/2017     Pneumo Conj 13-V (2010&after) 06/05/2017     Pneumococcal 23 valent 09/29/2004, 05/12/2010        Health Maintenance                         Health Maintenance Due   Topic Date Due     Osteoporosis Screening (Dexa) -  every 3 years   05/01/2016     COPD ACTION PLAN Q1 YR  05/05/2016     Discuss Advance Directive Planning  08/05/2016     Microalbumin Lab - yearly  05/25/2017     Cholesterol Lab - yearly  06/05/2018     Medicare Annual Wellness Visit  06/05/2018     FALL RISK ASSESSMENT  06/05/2018     Wellness Visit with your Primary Provider - yearly  06/05/2018         Preventive Health Recommendations    Female Ages 65 +    Yearly exam:     See your health care provider every year in order to  o Review health changes.   o Discuss preventive care.    o Review your medicines if your doctor has prescribed any.      You no longer need a yearly Pap test unless you've had an abnormal Pap test in the past 10 years. If you have vaginal symptoms, such as bleeding or discharge, be sure to talk with your provider about a Pap test.      Every 1 to 2 years, have a mammogram.  If you are over 69, talk with your health care provider about whether or not you want to continue having screening mammograms.      Every 10 years, have a colonoscopy. Or, have a yearly FIT test (stool test). These exams will check for colon cancer.       Have a cholesterol test every 5 years, or more often if your doctor advises it.       Have a diabetes test (fasting glucose) every three years. If you are at risk for diabetes, you should have this test more often.       At age 65, have a bone density scan (DEXA) to check for osteoporosis (brittle bone disease).    Shots:    Get a flu shot each year.    Get a tetanus shot every 10 years.    Talk to your doctor about your pneumonia vaccines. There are now two you should receive - Pneumovax (PPSV 23) and Prevnar (PCV 13).    Talk to your doctor about the shingles vaccine.    Talk to your doctor about the hepatitis B vaccine.    Nutrition:     Eat at least 5 servings of fruits and vegetables each day.      Eat whole-grain bread, whole-wheat pasta and brown rice instead of white grains and rice.      Talk to your provider  about Calcium and Vitamin D.     Lifestyle    Exercise at least 150 minutes a week (30 minutes a day, 5 days a week). This will help you control your weight and prevent disease.      Limit alcohol to one drink per day.      No smoking.       Wear sunscreen to prevent skin cancer.       See your dentist twice a year for an exam and cleaning.      See your eye doctor every 1 to 2 years to screen for conditions such as glaucoma, macular degeneration and cataracts.

## 2018-06-07 ENCOUNTER — OFFICE VISIT (OUTPATIENT)
Dept: FAMILY MEDICINE | Facility: CLINIC | Age: 78
End: 2018-06-07
Payer: COMMERCIAL

## 2018-06-07 VITALS
HEART RATE: 74 BPM | BODY MASS INDEX: 20.4 KG/M2 | OXYGEN SATURATION: 97 % | TEMPERATURE: 97.6 F | WEIGHT: 130 LBS | HEIGHT: 67 IN | SYSTOLIC BLOOD PRESSURE: 146 MMHG | DIASTOLIC BLOOD PRESSURE: 96 MMHG

## 2018-06-07 DIAGNOSIS — Z12.39 SCREENING FOR BREAST CANCER: ICD-10-CM

## 2018-06-07 DIAGNOSIS — R41.3 MEMORY CHANGES: ICD-10-CM

## 2018-06-07 DIAGNOSIS — Z51.81 MEDICATION MONITORING ENCOUNTER: ICD-10-CM

## 2018-06-07 DIAGNOSIS — M15.0 PRIMARY OSTEOARTHRITIS INVOLVING MULTIPLE JOINTS: ICD-10-CM

## 2018-06-07 DIAGNOSIS — R82.90 NONSPECIFIC FINDING ON EXAMINATION OF URINE: ICD-10-CM

## 2018-06-07 DIAGNOSIS — I10 ESSENTIAL HYPERTENSION WITH GOAL BLOOD PRESSURE LESS THAN 140/90: ICD-10-CM

## 2018-06-07 DIAGNOSIS — J44.9 CHRONIC OBSTRUCTIVE PULMONARY DISEASE, UNSPECIFIED COPD TYPE (H): ICD-10-CM

## 2018-06-07 DIAGNOSIS — F41.9 ANXIETY: ICD-10-CM

## 2018-06-07 DIAGNOSIS — E53.8 VITAMIN B12 DEFICIENCY (NON ANEMIC): ICD-10-CM

## 2018-06-07 DIAGNOSIS — Z12.11 SCREEN FOR COLON CANCER: ICD-10-CM

## 2018-06-07 DIAGNOSIS — Z13.6 CARDIOVASCULAR SCREENING; LDL GOAL LESS THAN 130: ICD-10-CM

## 2018-06-07 DIAGNOSIS — Z13.820 SCREENING FOR OSTEOPOROSIS: ICD-10-CM

## 2018-06-07 DIAGNOSIS — Z00.00 ENCOUNTER FOR ROUTINE ADULT HEALTH EXAMINATION WITHOUT ABNORMAL FINDINGS: Primary | ICD-10-CM

## 2018-06-07 LAB
ALBUMIN SERPL-MCNC: 4.1 G/DL (ref 3.4–5)
ALBUMIN UR-MCNC: ABNORMAL MG/DL
ALP SERPL-CCNC: 46 U/L (ref 40–150)
ALT SERPL W P-5'-P-CCNC: 21 U/L (ref 0–50)
ANION GAP SERPL CALCULATED.3IONS-SCNC: 8 MMOL/L (ref 3–14)
APPEARANCE UR: CLEAR
AST SERPL W P-5'-P-CCNC: 18 U/L (ref 0–45)
BACTERIA #/AREA URNS HPF: ABNORMAL /HPF
BILIRUB SERPL-MCNC: 0.9 MG/DL (ref 0.2–1.3)
BILIRUB UR QL STRIP: NEGATIVE
BUN SERPL-MCNC: 15 MG/DL (ref 7–30)
CALCIUM SERPL-MCNC: 9.7 MG/DL (ref 8.5–10.1)
CHLORIDE SERPL-SCNC: 108 MMOL/L (ref 94–109)
CHOLEST SERPL-MCNC: 192 MG/DL
CK SERPL-CCNC: 70 U/L (ref 30–225)
CO2 SERPL-SCNC: 28 MMOL/L (ref 20–32)
COLOR UR AUTO: YELLOW
CREAT SERPL-MCNC: 0.72 MG/DL (ref 0.52–1.04)
CREAT UR-MCNC: 130 MG/DL
ERYTHROCYTE [DISTWIDTH] IN BLOOD BY AUTOMATED COUNT: 13.4 % (ref 10–15)
GFR SERPL CREATININE-BSD FRML MDRD: 79 ML/MIN/1.7M2
GLUCOSE SERPL-MCNC: 97 MG/DL (ref 70–99)
GLUCOSE UR STRIP-MCNC: NEGATIVE MG/DL
HCT VFR BLD AUTO: 47.1 % (ref 35–47)
HDLC SERPL-MCNC: 59 MG/DL
HGB BLD-MCNC: 16.3 G/DL (ref 11.7–15.7)
HGB UR QL STRIP: NEGATIVE
KETONES UR STRIP-MCNC: ABNORMAL MG/DL
LDLC SERPL CALC-MCNC: 118 MG/DL
LEUKOCYTE ESTERASE UR QL STRIP: ABNORMAL
MCH RBC QN AUTO: 31 PG (ref 26.5–33)
MCHC RBC AUTO-ENTMCNC: 34.6 G/DL (ref 31.5–36.5)
MCV RBC AUTO: 90 FL (ref 78–100)
MICROALBUMIN UR-MCNC: 21 MG/L
MICROALBUMIN/CREAT UR: 16.31 MG/G CR (ref 0–25)
NITRATE UR QL: NEGATIVE
NONHDLC SERPL-MCNC: 133 MG/DL
PH UR STRIP: 7 PH (ref 5–7)
PLATELET # BLD AUTO: 307 10E9/L (ref 150–450)
POTASSIUM SERPL-SCNC: 3.9 MMOL/L (ref 3.4–5.3)
PROT SERPL-MCNC: 7.4 G/DL (ref 6.8–8.8)
RBC # BLD AUTO: 5.25 10E12/L (ref 3.8–5.2)
RBC #/AREA URNS AUTO: ABNORMAL /HPF
SODIUM SERPL-SCNC: 144 MMOL/L (ref 133–144)
SOURCE: ABNORMAL
SP GR UR STRIP: 1.02 (ref 1–1.03)
T4 FREE SERPL-MCNC: 1.12 NG/DL (ref 0.76–1.46)
TRIGL SERPL-MCNC: 76 MG/DL
TSH SERPL DL<=0.005 MIU/L-ACNC: 0.41 MU/L (ref 0.4–4)
UROBILINOGEN UR STRIP-ACNC: 0.2 EU/DL (ref 0.2–1)
VIT B12 SERPL-MCNC: 535 PG/ML (ref 193–986)
WBC # BLD AUTO: 4.1 10E9/L (ref 4–11)
WBC #/AREA URNS AUTO: ABNORMAL /HPF

## 2018-06-07 PROCEDURE — 80061 LIPID PANEL: CPT | Performed by: FAMILY MEDICINE

## 2018-06-07 PROCEDURE — 36415 COLL VENOUS BLD VENIPUNCTURE: CPT | Performed by: FAMILY MEDICINE

## 2018-06-07 PROCEDURE — 81001 URINALYSIS AUTO W/SCOPE: CPT | Performed by: FAMILY MEDICINE

## 2018-06-07 PROCEDURE — 80053 COMPREHEN METABOLIC PANEL: CPT | Performed by: FAMILY MEDICINE

## 2018-06-07 PROCEDURE — 84439 ASSAY OF FREE THYROXINE: CPT | Performed by: FAMILY MEDICINE

## 2018-06-07 PROCEDURE — 83921 ORGANIC ACID SINGLE QUANT: CPT | Mod: 90 | Performed by: FAMILY MEDICINE

## 2018-06-07 PROCEDURE — 85027 COMPLETE CBC AUTOMATED: CPT | Performed by: FAMILY MEDICINE

## 2018-06-07 PROCEDURE — 82607 VITAMIN B-12: CPT | Performed by: FAMILY MEDICINE

## 2018-06-07 PROCEDURE — 84443 ASSAY THYROID STIM HORMONE: CPT | Performed by: FAMILY MEDICINE

## 2018-06-07 PROCEDURE — 82550 ASSAY OF CK (CPK): CPT | Performed by: FAMILY MEDICINE

## 2018-06-07 PROCEDURE — 99397 PER PM REEVAL EST PAT 65+ YR: CPT | Performed by: FAMILY MEDICINE

## 2018-06-07 PROCEDURE — 87086 URINE CULTURE/COLONY COUNT: CPT | Performed by: FAMILY MEDICINE

## 2018-06-07 PROCEDURE — 99000 SPECIMEN HANDLING OFFICE-LAB: CPT | Performed by: FAMILY MEDICINE

## 2018-06-07 PROCEDURE — 82043 UR ALBUMIN QUANTITATIVE: CPT | Performed by: FAMILY MEDICINE

## 2018-06-07 PROCEDURE — 82306 VITAMIN D 25 HYDROXY: CPT | Performed by: FAMILY MEDICINE

## 2018-06-07 NOTE — MR AVS SNAPSHOT
After Visit Summary   6/7/2018    Katty Hayward    MRN: [id number]           Patient Information     Date Of Birth          1940        Visit Information        Provider Department      6/7/2018 11:20 AM Emanuel Carver MD [facility]        Today's Diagnoses     Encounter for routine adult health examination without abnormal findings    -  1    Chronic obstructive pulmonary disease, unspecified COPD type (H)        Essential hypertension with goal blood pressure less than 140/90        CARDIOVASCULAR SCREENING; LDL GOAL LESS THAN 130        Primary osteoarthritis involving multiple joints        Anxiety        Memory changes        Vitamin B12 deficiency (non anemic)        Screen for colon cancer        Screening for breast cancer        Screening for osteoporosis        Medication monitoring encounter          Care Instructions    Shingrix    Consider mammogram, DEXA, Colonoscopy    Cetaphil/cerave skin lotion      [facility]                        To reach your care team during and after hours:   [phone]  To reach our pharmacy:        [phone]    Clinic Hours                        Our clinic hours are:    Monday   7:30 am to 7:00 pm                  Tuesday through Friday 7:30 am to 5:00 pm                             Saturday   8:00 am to 12:00 pm      Sunday   Closed      Pharmacy Hours                        Our pharmacy hours are:    Monday   8:30 am to 7:00 pm       Tuesday to Friday  8:30 am to 6:00 pm                       Saturday    9:00 am to 1:00 pm              Sunday    Closed              There is also information available at our web site:  [email or web address]    If your provider ordered any lab tests and you do not receive the results within 10 business days, please call the clinic.    If you need a medication refill please contact your pharmacy.  Please allow 2-3 business days for your refill to be completed.    Our clinic offers  telephone visits and e visits.  Please ask one of your team members to explain more.      Use G-modehart (secure email communication and access to your chart) to send your primary care provider a message or make an appointment. Ask someone on your Team how to sign up for G-modehart.  Immunizations                      Immunization History   Administered Date(s) Administered     HepB-Adult 01/01/1990, 02/01/1990, 06/01/1990     Influenza (High Dose) 3 valent vaccine 10/03/2011, 11/12/2012, 10/23/2013     Influenza (IIV3) PF 11/03/2004, 10/31/2005, 10/08/2006, 11/01/2007, 11/04/2008, 12/02/2009, 09/21/2010     Influenza Vaccine IM 3yrs+ 4 Valent IIV4 10/01/2017     Pneumo Conj 13-V (2010&after) 06/05/2017     Pneumococcal 23 valent 09/29/2004, 05/12/2010        Health Maintenance                         Health Maintenance Due   Topic Date Due     Osteoporosis Screening (Dexa) - every 3 years   05/01/2016     COPD ACTION PLAN Q1 YR  05/05/2016     Discuss Advance Directive Planning  08/05/2016     Microalbumin Lab - yearly  05/25/2017     Cholesterol Lab - yearly  06/05/2018     Medicare Annual Wellness Visit  06/05/2018     FALL RISK ASSESSMENT  06/05/2018     Wellness Visit with your Primary Provider - yearly  06/05/2018         Preventive Health Recommendations    Female Ages 65 +    Yearly exam:     See your health care provider every year in order to  o Review health changes.   o Discuss preventive care.    o Review your medicines if your doctor has prescribed any.      You no longer need a yearly Pap test unless you've had an abnormal Pap test in the past 10 years. If you have vaginal symptoms, such as bleeding or discharge, be sure to talk with your provider about a Pap test.      Every 1 to 2 years, have a mammogram.  If you are over 69, talk with your health care provider about whether or not you want to continue having screening mammograms.      Every 10 years, have a colonoscopy. Or, have a yearly FIT test (stool  test). These exams will check for colon cancer.       Have a cholesterol test every 5 years, or more often if your doctor advises it.       Have a diabetes test (fasting glucose) every three years. If you are at risk for diabetes, you should have this test more often.       At age 65, have a bone density scan (DEXA) to check for osteoporosis (brittle bone disease).    Shots:    Get a flu shot each year.    Get a tetanus shot every 10 years.    Talk to your doctor about your pneumonia vaccines. There are now two you should receive - Pneumovax (PPSV 23) and Prevnar (PCV 13).    Talk to your doctor about the shingles vaccine.    Talk to your doctor about the hepatitis B vaccine.    Nutrition:     Eat at least 5 servings of fruits and vegetables each day.      Eat whole-grain bread, whole-wheat pasta and brown rice instead of white grains and rice.      Talk to your provider about Calcium and Vitamin D.     Lifestyle    Exercise at least 150 minutes a week (30 minutes a day, 5 days a week). This will help you control your weight and prevent disease.      Limit alcohol to one drink per day.      No smoking.       Wear sunscreen to prevent skin cancer.       See your dentist twice a year for an exam and cleaning.      See your eye doctor every 1 to 2 years to screen for conditions such as glaucoma, macular degeneration and cataracts.          Follow-ups after your visit        Additional Services     NEUROLOGY ADULT REFERRAL       Your provider has referred you for the following:   Consult at [facility]: [facility] [phone] - Neuropsych testing [email or web address]    Please be aware that coverage of these services is subject to the terms and limitations of your health insurance plan.  Call member services at your health plan with any benefit or coverage questions.      Please bring the following with you to your appointment:    (1) Any X-Rays, CTs or MRIs which  have been performed.  Contact the facility where they were done to arrange for  prior to your scheduled appointment.    (2) List of current medications  (3) This referral request   (4) Any documents/labs given to you for this referral                  Future tests that were ordered for you today     Open Future Orders        Priority Expected Expires Ordered    DX Hip/Pelvis/Spine Routine  6/7/2019 6/7/2018    *MA Screening Digital Bilateral Routine  6/7/2019 6/7/2018    Fecal colorectal cancer screen (FIT) Routine 6/27/2018 8/29/2018 6/7/2018            Who to contact     If you have questions or need follow up information about today's clinic visit or your schedule please contact [facility] directly at [phone].  Normal or non-critical lab and imaging results will be communicated to you by TermSynchart, letter or phone within 4 business days after the clinic has received the results. If you do not hear from us within 7 days, please contact the clinic through TermSynchart or phone. If you have a critical or abnormal lab result, we will notify you by phone as soon as possible.  Submit refill requests through [employer] or call your pharmacy and they will forward the refill request to us. Please allow 3 business days for your refill to be completed.          Additional Information About Your Visit        [employer] Information     [employer] gives you secure access to your electronic health record. If you see a primary care provider, you can also send messages to your care team and make appointments. If you have questions, please call your primary care clinic.  If you do not have a primary care provider, please call [phone] and they will assist you.        Care EveryWhere ID     This is your Care EveryWhere ID. This could be used by other organizations to access your [place] medical records  [id number]        Your Vitals Were     Pulse Temperature Height Pulse Oximetry BMI (Body Mass Index)       74  "97.6  F (36.4  C) (Oral) 5' 7\" (1.702 m) 97% 20.36 kg/m2        Blood Pressure from Last 3 Encounters:   06/07/18 (!) 146/96   06/05/17 134/80   05/25/16 138/88    Weight from Last 3 Encounters:   06/07/18 130 lb (59 kg)   06/05/17 138 lb (62.6 kg)   09/13/16 135 lb (61.2 kg)              We Performed the Following     *UA reflex to Microscopic and Culture ([place] and [facility] (except [place] and [place])     Albumin Random Urine Quantitative with Creat Ratio     CBC with platelets     CK total     Comprehensive metabolic panel     Lipid panel reflex to direct LDL Fasting     Methylmalonic acid     NEUROLOGY ADULT REFERRAL     T4 free     TSH with free T4 reflex     Vitamin B12     Vitamin D Deficiency          Today's Medication Changes          These changes are accurate as of 6/7/18 12:38 PM.  If you have any questions, ask your nurse or doctor.               These medicines have changed or have updated prescriptions.        Dose/Directions    omeprazole 20 MG CR capsule   Commonly known as:  priLOSEC   This may have changed:  when to take this   Used for:  Reflux esophagitis        Dose:  20 mg   Take 1 capsule (20 mg) by mouth 2 times daily   Quantity:  180 capsule   Refills:  3         Stop taking these medicines if you haven't already. Please contact your care team if you have questions.     ipratropium - albuterol 0.5 mg/2.5 mg/3 mL 0.5-2.5 (3) MG/3ML neb solution   Commonly known as:  DUONEB   Stopped by:  Emanuel Carver MD           Ipratropium-Albuterol  MCG/ACT inhaler   Commonly known as:  COMBIVENT RESPIMAT   Stopped by:  Emanuel Carver MD                    Primary Care Provider Office Phone # Fax #    [facility] [phone] [address]  [place] [address]        Equal Access to Services     [facility] JACEKLIN AH: Lois salvadoro Soginaali, waaxda luqadaha, qaybta kaalmada adeegyada, waxay abdirahman del toro. So wac " [phone].    ATENCIÓN: Si jean marie cuellar, tiene a morel disposición servicios gratuitos de asistencia lingüística. Jarred beckford [phone].    We comply with applicable federal civil rights laws and [place] laws. We do not discriminate on the basis of race, color, national origin, age, disability, sex, sexual orientation, or gender identity.            Thank you!     Thank you for choosing [facility]  for your care. Our goal is always to provide you with excellent care. Hearing back from our patients is one way we can continue to improve our services. Please take a few minutes to complete the written survey that you may receive in the mail after your visit with us. Thank you!             Your Updated Medication List - Protect others around you: Learn how to safely use, store and throw away your medicines at www.disposemymeds.org.          This list is accurate as of 6/7/18 12:38 PM.  Always use your most recent med list.                   Brand Name Dispense Instructions for use Diagnosis    aspirin 81 MG tablet     100    ONE DAILY    Essential hypertension, benign, Routine general medical examination at a health care facility       BREO ELLIPTA 100-25 MCG/INH oral inhaler   Generic drug:  fluticasone-vilanterol           citalopram 20 MG tablet    celeXA    90 tablet    TAKE 1 TABLET DAILY    Anxiety       cyanocobalamin 1000 MCG/ML injection    VITAMIN B12    1 mL    Inject 1 mL (1,000 mcg) into the muscle every 30 days    Vitamin B12 deficiency (non anemic)       gabapentin 300 MG capsule    NEURONTIN    90 capsule    TAKE 1 CAPSULE AT BEDTIME    Restless leg syndrome       losartan-hydrochlorothiazide 50-12.5 MG per tablet    HYZAAR    30 tablet    TAKE 1 TABLET DAILY    Essential hypertension with goal blood pressure less than 140/90       Multiple vitamin  s Tabs           omeprazole 20 MG CR capsule    priLOSEC    180 capsule    Take 1 capsule (20 mg) by mouth 2 times daily    Reflux  esophagitis       order for DME     1 Device    Nebulizer Machine    COPD (chronic obstructive pulmonary disease) (H)       pramipexole 0.25 MG tablet    MIRAPEX    60 tablet    TAKE 1 TABLET TWICE A DAY (NEED TO BE SEEN FOR FURTHER REFILLS)    Restless leg syndrome

## 2018-06-07 NOTE — LETTER
[address], [place] [address] [phone]   June 11, 2018    Katty Hayward  [address]  [place] [address]      Dear Katty,    Here is a summary of your recent test results:    Labs are overall quite good, except:     Few white and red cells are in your urine, with a negative culture.   Hemoglobin is a little high.     We advise:     Continue current cares.   Balanced low cholesterol diet.   Regular activity.   Recheck urine if any signs of a bladder infection.     Your test results are enclosed.      Please contact me if you have any questions.           Thank you very much for trusting [facility]..     Healthy regards,        Emanuel Carver M.D.        Results for orders placed or performed in visit on 06/07/18   Comprehensive metabolic panel   Result Value Ref Range    Sodium 144 133 - 144 mmol/L    Potassium 3.9 3.4 - 5.3 mmol/L    Chloride 108 94 - 109 mmol/L    Carbon Dioxide 28 20 - 32 mmol/L    Anion Gap 8 3 - 14 mmol/L    Glucose 97 70 - 99 mg/dL    Urea Nitrogen 15 7 - 30 mg/dL    Creatinine 0.72 0.52 - 1.04 mg/dL    GFR Estimate 79 >60 mL/min/1.7m2    GFR Estimate If Black >90 >60 mL/min/1.7m2    Calcium 9.7 8.5 - 10.1 mg/dL    Bilirubin Total 0.9 0.2 - 1.3 mg/dL    Albumin 4.1 3.4 - 5.0 g/dL    Protein Total 7.4 6.8 - 8.8 g/dL    Alkaline Phosphatase 46 40 - 150 U/L    ALT 21 0 - 50 U/L    AST 18 0 - 45 U/L   Lipid panel reflex to direct LDL Fasting   Result Value Ref Range    Cholesterol 192 <200 mg/dL    Triglycerides 76 <150 mg/dL    HDL Cholesterol 59 >49 mg/dL    LDL Cholesterol Calculated 118 (H) <100 mg/dL    Non HDL Cholesterol 133 (H) <130 mg/dL   CK total   Result Value Ref Range    CK Total 70 30 - 225 U/L   CBC with platelets   Result Value Ref Range    WBC 4.1 4.0 - 11.0 10e9/L    RBC Count 5.25 (H) 3.8 - 5.2 10e12/L    Hemoglobin 16.3 (H) 11.7 - 15.7 g/dL    Hematocrit 47.1 (H)  35.0 - 47.0 %    MCV 90 78 - 100 fl    MCH 31.0 26.5 - 33.0 pg    MCHC 34.6 31.5 - 36.5 g/dL    RDW 13.4 10.0 - 15.0 %    Platelet Count 307 150 - 450 10e9/L   TSH with free T4 reflex   Result Value Ref Range    TSH 0.41 0.40 - 4.00 mU/L   Albumin Random Urine Quantitative with Creat Ratio   Result Value Ref Range    Creatinine Urine 130 mg/dL    Albumin Urine mg/L 21 mg/L    Albumin Urine mg/g Cr 16.31 0 - 25 mg/g Cr   *UA reflex to Microscopic and Culture ([place] and [facility] (except [place] and [place])   Result Value Ref Range    Color Urine Yellow     Appearance Urine Clear     Glucose Urine Negative NEG^Negative mg/dL    Bilirubin Urine Negative NEG^Negative    Ketones Urine Trace (A) NEG^Negative mg/dL    Specific Gravity Urine 1.020 1.003 - 1.035    Blood Urine Negative NEG^Negative    pH Urine 7.0 5.0 - 7.0 pH    Protein Albumin Urine Trace (A) NEG^Negative mg/dL    Urobilinogen Urine 0.2 0.2 - 1.0 EU/dL    Nitrite Urine Negative NEG^Negative    Leukocyte Esterase Urine Small (A) NEG^Negative    Source Midstream Urine    Vitamin B12   Result Value Ref Range    Vitamin B12 535 193 - 986 pg/mL   Methylmalonic acid   Result Value Ref Range    Methylmalonic Acid 0.10 0.00 - 0.40 umol/L   Vitamin D Deficiency   Result Value Ref Range    Vitamin D Deficiency screening 66 20 - 75 ug/L   T4 free   Result Value Ref Range    T4 Free 1.12 0.76 - 1.46 ng/dL   Urine Microscopic   Result Value Ref Range    WBC Urine 5-10 (A) OTO5^0 - 5 /HPF    RBC Urine 2-5 (A) OTO2^O - 2 /HPF    Bacteria Urine Few (A) NEG^Negative /HPF   Urine Culture Aerobic Bacterial   Result Value Ref Range    Specimen Description Midstream Urine     Culture Micro       10,000 to 50,000 colonies/mL  mixed urogenital [place]  Susceptibility testing not routinely done

## 2018-06-07 NOTE — PROGRESS NOTES
SUBJECTIVE:   Katty Hayward is a 78 year old female who presents for Preventive Visit.  Are you in the first 12 months of your Medicare Part B coverage?  No    Healthy Habits:    Do you get at least three servings of calcium containing foods daily (dairy, green leafy vegetables, etc.)? yes    Amount of exercise or daily activities, outside of work: not recently     Problems taking medications regularly No    Medication side effects: No    Have you had an eye exam in the past two years? yes    Do you see a dentist twice per year? yes    Do you have sleep apnea, excessive snoring or daytime drowsiness?YES daytime drowsiness,really having issues sleeping at night, hard time falling and staying        Ability to successfully perform activities of daily living: Yes, no assistance needed    Home safety:  none identified, home in [place] is one level, will be moving there permanently  after selling home here in [place]      Hearing impairment: Yes, just slight     Fall risk:  Fallen 2 or more times in the past year?: No  Any fall with injury in the past year?: No    click delete button to remove this line now    COGNITIVE SCREEN  1) Repeat 3 items (Banana, Sunrise, Chair)      2) Clock draw:   NORMAL  3) 3 item recall:   Recalls 3 objects  Results: 3 items recalled: COGNITIVE IMPAIRMENT LESS LIKELY    Mini-CogTM Copyright S Jas. Licensed by the author for use in [facility]; reprinted with permission (khurram@[facility]). All rights reserved.      Anxiety - doing well - selling house and moving to [place] -     COPD - doing well - using [facility] [place] [facility]    Htn    Lab Results   Component Value Date    CR 0.84 06/05/2017     BP Readings from Last 3 Encounters:   06/07/18 (!) 146/96   06/05/17 134/80   05/25/16 138/88     GERD - well controlled    Memory -  notes concerns    B12 - Hx of deficiency - monthly injection    Reviewed and updated as needed this visit by clinical staff  Tobacco  Allergies  Meds  Med Hx   Soc Hx        Reviewed and updated as needed this visit by Provider        Social History   Substance Use Topics     Smoking status: Former Smoker     Packs/day: 1.00     Types: Cigarettes     Quit date: 4/22/1996     Smokeless tobacco: Never Used      Comment: STOPPED [id number]     Alcohol use Yes      Comment: SELDOM       If you drink alcohol do you typically have >3 drinks per day or >7 drinks per week? No                        Today's PHQ-2 Score:   PHQ-2 ( 1999 Pf[employer]) 6/7/2018 6/7/2018   Q1: Little interest or pleasure in doing things 1 0   Q2: Feeling down, depressed or hopeless 0 0   PHQ-2 Score 1 0       Do you feel safe in your environment - Yes    Do you have a Health Care Directive?: pending    Current providers sharing in care for this patient include:   Patient Care Team:  Ciro Castillo as PCP - General    The following health maintenance items are reviewed in Epic and correct as of today:  Health Maintenance   Topic Date Due     MAMMO Q1 YR  08/28/2014     DEXA Q3 YR  05/01/2016     COPD ACTION PLAN Q1 YR  05/05/2016     MEDICARE ANNUAL WELLNESS VISIT  06/05/2018     TETANUS IMMUNIZATION (SYSTEM ASSIGNED)  11/04/2018     FALL RISK ASSESSMENT  06/07/2019     LIPID MONITORING Q1 YEAR  06/07/2019     MICROALBUMIN Q1 YEAR  06/07/2019     WELLNESS VISIT Q1 YR  06/07/2019     PHQ-2 Q1 YR  06/07/2019     COLONOSCOPY Q10 YR  04/23/2023     ADVANCE DIRECTIVE PLANNING Q5 YRS  06/07/2023     SPIROMETRY ONETIME  Completed     PNEUMOCOCCAL  Completed     INFLUENZA VACCINE  Completed     Health Maintenance     Colonoscopy:  Consider in 2023   FIT:  consider              Mammogram:  advised              PAP:  NA   DEXA:  advised    Health Maintenance Due   Topic Date Due     MAMMO Q1 YR  08/28/2014     DEXA Q3 YR  05/01/2016     COPD ACTION PLAN Q1 YR  05/05/2016     MEDICARE ANNUAL WELLNESS VISIT  06/05/2018       Current Problem List    Patient Active Problem List   Diagnosis     Hypertension, goal  below 140/90     Osteoarthritis     Reflux esophagitis     Nontoxic uninodular goiter     Personal history of tobacco use, presenting hazards to health     Benign neoplasm of colon     Congenital anomaly of lung     Anxiety     CARDIOVASCULAR SCREENING; LDL GOAL LESS THAN 130     Advanced directives, counseling/discussion     COPD (chronic obstructive pulmonary disease) (H)     Vitamin B12 deficiency (non anemic)       Past Medical History    Past Medical History:   Diagnosis Date     Anxiety 6/26/2010     Benign neoplasm of colon     nl colonoscopy 8/06     Essential hypertension, benign      Insomnia with sleep apnea, unspecified did not tolerate CPAP    sees  Darnell     Nontoxic uninodular goiter thyroid nodules; stable    benign DR. 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 Patrick       Past Surgical History    Past Surgical History:   Procedure Laterality Date     CATARACT IOL, RT/LT  8/2011    Lt.     COLONOSCOPY       SURGICAL HISTORY OF -   5/20/2010    Bunionectomy & hammer toe repair     SURGICAL HISTORY OF -       Tonsils and Adenoids     SURGICAL HISTORY OF -   8/06    normal colonoscopy     SURGICAL HISTORY OF -   2006    vein stripping in [place]     SURGICAL HISTORY OF -   10/08    bilateral blepharoplasty        Current Medications    Current Outpatient Prescriptions   Medication Sig Dispense Refill     ASPIRIN 81 MG OR TABS ONE DAILY 100 3     BREO ELLIPTA 100-25 MCG/INH oral inhaler         citalopram (CELEXA) 20 MG tablet TAKE 1 TABLET DAILY 90 tablet 0     cyanocobalamin (VITAMIN B12) 1000 MCG/ML injection Inject 1 mL (1,000 mcg) into the muscle every 30 days 1 mL 11     gabapentin (NEURONTIN) 300 MG capsule TAKE 1 CAPSULE AT BEDTIME 90 capsule 1     losartan-hydrochlorothiazide (HYZAAR) 50-12.5 MG per tablet TAKE 1 TABLET DAILY 30 tablet 0     MULTIPLE VITAMINS OR TABS        omeprazole (PRILOSEC) 20 MG CR capsule Take 1 capsule (20 mg) by mouth 2 times daily (Patient taking differently: Take 20 mg by mouth daily ) 180 capsule 3     ORDER FOR DME Nebulizer Machine 1 Device 0     pramipexole (MIRAPEX) 0.25 MG tablet TAKE 1 TABLET TWICE A DAY (NEED TO BE SEEN FOR FURTHER REFILLS) 60 tablet 0       Allergies    Allergies   Allergen Reactions     Tetanus Toxoid        Immunizations    Immunization History   Administered Date(s) Administered     HepB-Adult 01/01/1990, 02/01/1990, 06/01/1990     Influenza (High Dose) 3 valent vaccine 10/03/2011, 11/12/2012, 10/23/2013     Influenza (IIV3) PF 11/03/2004, 10/31/2005, 10/08/2006, 11/01/2007, 11/04/2008, 12/02/2009, 09/21/2010     Influenza Vaccine IM 3yrs+ 4 Valent IIV4 10/01/2017     Pneumo Conj 13-V (2010&after) 06/05/2017     Pneumococcal 23 valent 09/29/2004, 05/12/2010       Family History    Family History   Problem Relation Age of Onset     Cancer Mother      COLON CANCER,HEART DISEASE     Cancer - colorectal Mother      Arthritis Mother      Respiratory Father      COPD       Social History    Social History     Social History     Marital status:      Spouse name: Mikael     Number of children: 2     Years of education: N/A     Occupational History     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 No     Blood Transfusions No     Sleep Concern No     Exercise Yes     Seat Belt Yes     Social History Narrative       ROS:  CONSTITUTIONAL: NEGATIVE for fever, chills, change in weight  INTEGUMENTARY/SKIN: NEGATIVE for worrisome rashes, moles or lesions  EYES: NEGATIVE for vision changes or irritation  ENT/MOUTH: NEGATIVE for ear, mouth and throat problems  RESP: NEGATIVE for significant cough or SOB  BREAST: NEGATIVE for masses, tenderness or discharge  CV: NEGATIVE for chest pain, palpitations or peripheral edema  GI: NEGATIVE for nausea, abdominal pain, heartburn, or change in bowel habits  : NEGATIVE for frequency, dysuria, or hematuria  MUSCULOSKELETAL: NEGATIVE for significant arthralgias or myalgia  NEURO: NEGATIVE for weakness, dizziness or paresthesias  ENDOCRINE: NEGATIVE for temperature intolerance, skin/hair changes  HEME: NEGATIVE for bleeding problems  PSYCHIATRIC: NEGATIVE for changes in mood or affect    OBJECTIVE:   BP (!) 146/96  Pulse 74  Temp 97.6  F (36.4  C) (Oral)  Ht 5' 7\" (1.702 m)  Wt 130 lb (59 kg)  SpO2 97%  BMI 20.36 kg/m2 Estimated body mass index is 20.36 kg/(m^2) as calculated from the following:    Height as of this encounter: 5' 7\" (1.702 m).    Weight as of this encounter: 130 lb (59 kg).  EXAM:   GENERAL: healthy, alert and no distress  EYES: Eyes grossly normal to inspection, PERRL and conjunctivae and sclerae normal  HENT: ear canals and TM's normal, nose and mouth without ulcers or lesions  NECK: no adenopathy, no asymmetry, masses, or scars and thyroid normal to palpation  RESP: lungs clear to auscultation - no rales, rhonchi or wheezes  BREAST: declined  CV: regular rate and rhythm, normal S1 S2, no S3 or S4, no murmur, click or rub, no peripheral edema and peripheral pulses strong  ABDOMEN: soft, nontender, no hepatosplenomegaly, no " masses and bowel sounds normal   (female): declined  RECTAL: declined  MS: no gross musculoskeletal defects noted, no edema  SKIN: no suspicious lesions or rashes  NEURO: Normal strength and tone, mentation intact and speech normal  PSYCH: mentation appears normal, affect normal/bright  LYMPH: no cervical adenopathy    ASSESSMENT / PLAN:       ICD-10-CM    1. Encounter for routine adult health examination without abnormal findings Z00.00 Comprehensive metabolic panel     Lipid panel reflex to direct LDL Fasting     CK total     CBC with platelets     TSH with free T4 reflex     Albumin Random Urine Quantitative with Creat Ratio     *UA reflex to Microscopic and Culture ([facility] (except [place] and [facility])     Vitamin B12     Methylmalonic acid     Vitamin D Deficiency     Fecal colorectal cancer screen (FIT)     T4 free     Urine Microscopic   2. Chronic obstructive pulmonary disease, unspecified COPD type (H) J44.9    3. Essential hypertension with goal blood pressure less than 140/90 I10 Comprehensive metabolic panel     Albumin Random Urine Quantitative with Creat Ratio     *UA reflex to Microscopic and Culture ([facility] (except [place] [facility])   4. CARDIOVASCULAR SCREENING; LDL GOAL LESS THAN 130 Z13.6 Comprehensive metabolic panel     Lipid panel reflex to direct LDL Fasting     CK total   5. Primary osteoarthritis involving multiple joints M15.0    6. Anxiety F41.9 TSH with free T4 reflex     Vitamin B12     Methylmalonic acid     Vitamin D Deficiency     T4 free   7. Memory changes R41.3 T4 free     NEUROLOGY ADULT REFERRAL   8. Vitamin B12 deficiency (non anemic) E53.8 Vitamin B12     Methylmalonic acid   9. Screen for colon cancer Z12.11 Fecal colorectal cancer screen (FIT)   10. Screening for breast cancer Z12.31 *MA Screening Digital Bilateral   11. Screening for osteoporosis Z13.820 DX Hip/Pelvis/Spine   12. Medication monitoring encounter Z51.81 T4  "free   13. Nonspecific finding on examination of urine R82.90 Urine Culture Aerobic Bacterial     Discussed treatment/modality options, including risk and benefits, she desires advised alcohol consumption 1oz per day or less, advised aspirin 81 mg po daily, advised 1 multivitamin per day, advised calcium 0613-5673 mg/d and Vitamin D 800-1200 IU/d, advised dentist every 6 months, advised diet and exercise, advised opthalmologist every 1-2 years, advised self breast exam q month, further health care maintenance, further lab(s), medication refill(s) and referral(s). All diagnosis above reviewed and noted above, otherwise stable.  See [facility] orders for further details.      Return in about 4 weeks (around 7/5/2018), or if symptoms worsen or fail to improve, for Routine Visit.    Health Maintenance Due   Topic Date Due     MAMMO Q1 YR  08/28/2014     DEXA Q3 YR  05/01/2016     COPD ACTION PLAN Q1 YR  05/05/2016     MEDICARE ANNUAL WELLNESS VISIT  06/05/2018       End of Life Planning:  Patient currently has an advanced directive: pending    COUNSELING:  Reviewed preventive health counseling, as reflected in patient instructions       Regular exercise       Healthy diet/nutrition       Vision screening       Hearing screening       Dental care       Aspirin Prophylaxsis       Osteoporosis Prevention/Bone Health       Colon cancer screening       Advanced Planning         Estimated body mass index is 20.36 kg/(m^2) as calculated from the following:    Height as of this encounter: 5' 7\" (1.702 m).    Weight as of this encounter: 130 lb (59 kg).       reports that she quit smoking about 22 years ago. Her smoking use included Cigarettes. She smoked 1.00 pack per day. She has never used smokeless tobacco.      Appropriate preventive services were discussed with this patient, including applicable screening as appropriate for cardiovascular disease, diabetes, osteopenia/osteoporosis, and glaucoma.  As appropriate for " age/gender, discussed screening for colorectal cancer, prostate cancer, breast cancer, and cervical cancer. Checklist reviewing preventive services available has been given to the patient.    Reviewed patients plan of care and provided an AVS. The Intermediate Care Plan ( asthma action plan, low back pain action plan, and migraine action plan) for Katty meets the Care Plan requirement. This Care Plan has been established and reviewed with the Patient.    Counseling Resources:  ATP IV Guidelines  Pooled Cohorts Equation Calculator  Breast Cancer Risk Calculator  FRAX Risk Assessment  ICSI Preventive Guidelines  Dietary Guidelines for Americans, 2010  USDA's MyPlate  ASA Prophylaxis  Lung CA Screening    Emanuel Carver MD [facility]

## 2018-06-08 LAB
DEPRECATED CALCIDIOL+CALCIFEROL SERPL-MC: 66 UG/L (ref 20–75)
METHYLMALONATE SERPL-SCNC: 0.1 UMOL/L (ref 0–0.4)

## 2018-06-09 LAB
BACTERIA SPEC CULT: NORMAL
SPECIMEN SOURCE: NORMAL

## 2018-06-11 ENCOUNTER — TELEPHONE (OUTPATIENT)
Dept: BONE DENSITY | Facility: CLINIC | Age: 78
End: 2018-06-11

## 2018-06-12 PROCEDURE — 82274 ASSAY TEST FOR BLOOD FECAL: CPT | Performed by: FAMILY MEDICINE

## 2018-06-15 DIAGNOSIS — Z12.11 SCREEN FOR COLON CANCER: ICD-10-CM

## 2018-06-15 DIAGNOSIS — Z00.00 ENCOUNTER FOR ROUTINE ADULT HEALTH EXAMINATION WITHOUT ABNORMAL FINDINGS: ICD-10-CM

## 2018-06-16 LAB — HEMOCCULT STL QL IA: NEGATIVE

## 2018-06-19 ENCOUNTER — TRANSFERRED RECORDS (OUTPATIENT)
Dept: HEALTH INFORMATION MANAGEMENT | Facility: CLINIC | Age: 78
End: 2018-06-19

## 2018-06-29 ENCOUNTER — TELEPHONE (OUTPATIENT)
Dept: FAMILY MEDICINE | Facility: CLINIC | Age: 78
End: 2018-06-29

## 2018-06-29 DIAGNOSIS — G25.81 RESTLESS LEG SYNDROME: ICD-10-CM

## 2018-06-29 DIAGNOSIS — E53.8 VITAMIN B12 DEFICIENCY (NON ANEMIC): ICD-10-CM

## 2018-06-29 DIAGNOSIS — F41.9 ANXIETY: ICD-10-CM

## 2018-06-29 DIAGNOSIS — I10 HYPERTENSION, GOAL BELOW 140/90: Primary | ICD-10-CM

## 2018-06-29 DIAGNOSIS — I10 ESSENTIAL HYPERTENSION WITH GOAL BLOOD PRESSURE LESS THAN 140/90: ICD-10-CM

## 2018-06-29 RX ORDER — LOSARTAN POTASSIUM AND HYDROCHLOROTHIAZIDE 12.5; 5 MG/1; MG/1
1 TABLET ORAL DAILY
Qty: 90 TABLET | Refills: 3 | Status: SHIPPED | OUTPATIENT
Start: 2018-06-29 | End: 2019-06-23

## 2018-06-29 RX ORDER — CYANOCOBALAMIN 1000 UG/ML
1 INJECTION, SOLUTION INTRAMUSCULAR; SUBCUTANEOUS
Qty: 1 ML | Refills: 11 | Status: SHIPPED | OUTPATIENT
Start: 2018-06-29

## 2018-06-29 RX ORDER — GABAPENTIN 300 MG/1
CAPSULE ORAL
Qty: 90 CAPSULE | Refills: 1 | Status: SHIPPED | OUTPATIENT
Start: 2018-06-29 | End: 2019-06-06

## 2018-06-29 RX ORDER — CITALOPRAM HYDROBROMIDE 20 MG/1
20 TABLET ORAL DAILY
Qty: 90 TABLET | Refills: 1 | Status: SHIPPED | OUTPATIENT
Start: 2018-06-29

## 2018-06-29 NOTE — TELEPHONE ENCOUNTER
Reason for Call:  Other prescription    Detailed comments: The patient is calling saying she has a medication question for the nurse. She would like a call back.    Phone Number Patient can be reached at: Home number on file [phone] (home)    Best Time: Anytime    Can we leave a detailed message on this number? YES    Call taken on 6/29/2018 at 1:52 PM by Antonieta Damon

## 2018-06-30 ENCOUNTER — HEALTH MAINTENANCE LETTER (OUTPATIENT)
Age: 78
End: 2018-06-30

## 2018-07-09 ENCOUNTER — TELEPHONE (OUTPATIENT)
Dept: BONE DENSITY | Facility: CLINIC | Age: 78
End: 2018-07-09

## 2018-08-29 ENCOUNTER — RADIANT APPOINTMENT (OUTPATIENT)
Dept: BONE DENSITY | Facility: CLINIC | Age: 78
End: 2018-08-29
Attending: FAMILY MEDICINE
Payer: COMMERCIAL

## 2018-08-29 DIAGNOSIS — Z13.820 SCREENING FOR OSTEOPOROSIS: ICD-10-CM

## 2018-08-29 PROCEDURE — 77085 DXA BONE DENSITY AXL VRT FX: CPT | Performed by: INTERNAL MEDICINE

## 2018-09-18 ENCOUNTER — OFFICE VISIT (OUTPATIENT)
Dept: FAMILY MEDICINE | Facility: CLINIC | Age: 78
End: 2018-09-18
Payer: COMMERCIAL

## 2018-09-18 VITALS
BODY MASS INDEX: 19.62 KG/M2 | SYSTOLIC BLOOD PRESSURE: 142 MMHG | WEIGHT: 125 LBS | DIASTOLIC BLOOD PRESSURE: 66 MMHG | HEIGHT: 67 IN | OXYGEN SATURATION: 96 % | HEART RATE: 64 BPM | TEMPERATURE: 98.2 F

## 2018-09-18 DIAGNOSIS — J44.9 CHRONIC OBSTRUCTIVE PULMONARY DISEASE, UNSPECIFIED COPD TYPE (H): ICD-10-CM

## 2018-09-18 DIAGNOSIS — Z87.891 PERSONAL HISTORY OF TOBACCO USE, PRESENTING HAZARDS TO HEALTH: ICD-10-CM

## 2018-09-18 DIAGNOSIS — Q33.9 CONGENITAL ANOMALY OF LUNG: ICD-10-CM

## 2018-09-18 DIAGNOSIS — J06.9 UPPER RESPIRATORY TRACT INFECTION, UNSPECIFIED TYPE: Primary | ICD-10-CM

## 2018-09-18 PROCEDURE — 99214 OFFICE O/P EST MOD 30 MIN: CPT | Performed by: PHYSICIAN ASSISTANT

## 2018-09-18 NOTE — MR AVS SNAPSHOT
After Visit Summary   9/18/2018    Katty Hayward    MRN: [id number]           Patient Information     Date Of Birth          1940        Visit Information        Provider Department      9/18/2018 2:20 PM Mahsa Shaw PA-C [facility]        Today's Diagnoses     Upper respiratory tract infection, unspecified type    -  1    Chronic obstructive pulmonary disease, unspecified COPD type (H)        Personal history of tobacco use, presenting hazards to health        Congenital anomaly of lung           Follow-ups after your visit        Follow-up notes from your care team     Return in about 5 days (around 9/23/2018) for If not improving, please be seen sooner if needed.      Who to contact     If you have questions or need follow up information about today's clinic visit or your schedule please contact [facility] directly at [phone].  Normal or non-critical lab and imaging results will be communicated to you by CrimeReport[employer]art, letter or phone within 4 business days after the clinic has received the results. If you do not hear from us within 7 days, please contact the clinic through [employer] or phone. If you have a critical or abnormal lab result, we will notify you by phone as soon as possible.  Submit refill requests through [employer] or call your pharmacy and they will forward the refill request to us. Please allow 3 business days for your refill to be completed.          Additional Information About Your Visit        MyChart Information     [employer] gives you secure access to your electronic health record. If you see a primary care provider, you can also send messages to your care team and make appointments. If you have questions, please call your primary care clinic.  If you do not have a primary care provider, please call [phone] and they will assist you.        Care EveryWhere ID     This is your Care EveryWhere ID. This could be used by other  "organizations to access your [facility] medical records  [id number]        Your Vitals Were     Pulse Temperature Height Pulse Oximetry Breastfeeding? BMI (Body Mass Index)    64 98.2  F (36.8  C) (Oral) 5' 7\" (1.702 m) 96% No 19.58 kg/m2       Blood Pressure from Last 3 Encounters:   09/18/18 142/66   06/07/18 (!) 146/96   06/05/17 134/80    Weight from Last 3 Encounters:   09/18/18 125 lb (56.7 kg)   06/07/18 130 lb (59 kg)   06/05/17 138 lb (62.6 kg)              Today, you had the following     No orders found for display         Today's Medication Changes          These changes are accurate as of 9/18/18 11:59 PM.  If you have any questions, ask your nurse or doctor.               These medicines have changed or have updated prescriptions.        Dose/Directions    omeprazole 20 MG CR capsule   Commonly known as:  priLOSEC   This may have changed:  when to take this   Used for:  Reflux esophagitis        Dose:  20 mg   Take 1 capsule (20 mg) by mouth 2 times daily   Quantity:  180 capsule   Refills:  3                Primary Care Provider Office Phone # Fax #    [facility] [phone] [address]  [place] [address]        Equal Access to Services     JESÚS BENÍTEZ AH: Lois salvadoro Sojory, waaxda luqadaha, qaybta kaalmada adeegyada, whit del toro. So [facility] [phone].    ATENCIÓN: Si habla español, tiene a morel disposición servicios gratuitos de asistencia lingüística. Llame al [phone].    We comply with applicable federal civil rights laws and [place] laws. We do not discriminate on the basis of race, color, national origin, age, disability, sex, sexual orientation, or gender identity.            Thank you!     Thank you for choosing [facility]  for your care. Our goal is always to provide you with excellent care. Hearing back from our patients is one way we can continue to improve our services. Please take a few " minutes to complete the written survey that you may receive in the mail after your visit with us. Thank you!             Your Updated Medication List - Protect others around you: Learn how to safely use, store and throw away your medicines at www.disposemymeds.org.          This list is accurate as of 9/18/18 11:59 PM.  Always use your most recent med list.                   Brand Name Dispense Instructions for use Diagnosis    aspirin 81 MG tablet     100    ONE DAILY    Essential hypertension, benign, Routine general medical examination at a health care facility       BREO ELLIPTA 100-25 MCG/INH inhaler   Generic drug:  fluticasone-vilanterol           citalopram 20 MG tablet    celeXA    90 tablet    Take 1 tablet (20 mg) by mouth daily    Anxiety       cyanocobalamin 1000 MCG/ML injection    VITAMIN B12    1 mL    Inject 1 mL (1,000 mcg) into the muscle every 30 days    Vitamin B12 deficiency (non anemic)       gabapentin 300 MG capsule    NEURONTIN    90 capsule    TAKE 1 CAPSULE AT BEDTIME    Restless leg syndrome       losartan-hydrochlorothiazide 50-12.5 MG per tablet    HYZAAR    90 tablet    Take 1 tablet by mouth daily    Essential hypertension with goal blood pressure less than 140/90       Multiple vitamin  s Tabs           omeprazole 20 MG CR capsule    priLOSEC    180 capsule    Take 1 capsule (20 mg) by mouth 2 times daily    Reflux esophagitis       order for DME     1 Device    Nebulizer Machine    COPD (chronic obstructive pulmonary disease) (H)       pramipexole 0.25 MG tablet    MIRAPEX    60 tablet    TAKE 1 TABLET TWICE A DAY (NEED TO BE SEEN FOR FURTHER REFILLS)    Restless leg syndrome

## 2018-09-18 NOTE — PROGRESS NOTES
"  SUBJECTIVE:                                                    Katty Hayward is a 78 year old female who presents to clinic today for the following health issues:      Acute Illness   Acute illness concerns: Cough  Onset: x3 days    Fever: no    Chills/Sweats: no    Headache (location?): YES- mild    Sinus Pressure:YES    Conjunctivitis:  no    Ear Pain: no    Rhinorrhea: YES- stuffy    Congestion: YES- chest    Sore Throat: no     Cough: YES-productive of yellow sputum - round encapsulated    Wheeze: no    Decreased Appetite: YES    Nausea: no    Vomiting: no    Diarrhea:  no    Dysuria/Freq.: no - needs to drink more water    Fatigue/Achiness: fatigued    Sick/Strep Exposure: YES-      Therapies Tried and outcome: Prednisone from pulmonologist - 4 yesterday and 4 today, levoquin 500mg started yesterday       She states that she is coughing a lot over the last 2-3 days.  She started the Levaquin and Prednisone from her pulmonary clinic yesterday.    She is not feeling short of breath or wheezing.  She does have a rescue inhaler.  She has not needed the albuterol with this illness.       Problem list and histories reviewed & adjusted, as indicated.  Additional history: as documented      ROS:  Constitutional, HEENT, cardiovascular, pulmonary, GI, , musculoskeletal, neuro, skin, endocrine and psych systems are negative, except as otherwise noted.    OBJECTIVE:                                                    /66 (BP Location: Left arm, Patient Position: Chair, Cuff Size: Adult Regular)  Pulse 64  Temp 98.2  F (36.8  C) (Oral)  Ht 5' 7\" (1.702 m)  Wt 125 lb (56.7 kg)  SpO2 96%  Breastfeeding? No  BMI 19.58 kg/m2  Body mass index is 19.58 kg/(m^2).  GENERAL: healthy, alert and no distress  EYES: Eyes grossly normal to inspection, PERRL and conjunctivae and sclerae normal  HENT: ear canals and TM's normal, nose and mouth without ulcers or lesions  NECK: no adenopathy, no asymmetry, masses, or " scars and thyroid normal to palpation  RESP: lungs clear to auscultation - no rales, rhonchi or wheezes  CV: regular rate and rhythm, normal S1 S2, no S3 or S4, no murmur, click or rub, no peripheral edema and peripheral pulses strong  ABDOMEN: soft, nontender, no hepatosplenomegaly, no masses and bowel sounds normal  MS: no gross musculoskeletal defects noted, no edema  NEURO: Normal strength and tone, mentation intact and speech normal  PSYCH: mentation appears normal, affect normal/bright    Diagnostic Test Results:  none      ASSESSMENT/PLAN:                                                      Katty was seen today for cough.    Diagnoses and all orders for this visit:    Upper respiratory tract infection, unspecified type    Chronic obstructive pulmonary disease, unspecified COPD type (H)    Personal history of tobacco use, presenting hazards to health    Congenital anomaly of lung    Patient appears to have a URI in the setting of chronic lung disease.  Patient has started the appropriate treatment of prednisone and Levaquin as provided by pulmonary clinic.  Patient has been advised to continue treatment course as advised.  Exam today is unremarkable.  Lung sounds are clear on exam.      Followup: Return in about 5 days (around 9/23/2018) for If not improving, please be seen sooner if needed.    -- I have discussed the patient's diagnosis, and my plan of treatment with the patient and/or family. Patient is aware to followup if symptoms do not improve.  Patient has been advised to be seen sooner or seek more immediate care if symptoms change or worsen.  Patient agrees with and understands the plan today.     See Patient Instructions        Mahsa Shaw PA-C    [facility]

## 2018-10-24 DIAGNOSIS — Q33.9 CONGENITAL ANOMALY OF LUNG: Primary | ICD-10-CM

## 2018-10-24 DIAGNOSIS — J44.9 CHRONIC OBSTRUCTIVE PULMONARY DISEASE, UNSPECIFIED COPD TYPE (H): ICD-10-CM

## 2018-10-24 NOTE — TELEPHONE ENCOUNTER
"Requested Prescriptions   Pending Prescriptions Disp Refills     BREO ELLIPTA 100-25 MCG/INH inhaler    Last Written Prescription Date:  na  Last Fill Quantity: na,  # refills: na   Last Office Visit: 9/18/2018   Future Office Visit:       No flowsheet data found.        Inhaled Steroids Protocol Passed    10/24/2018  1:58 PM       Passed - Patient is age 12 or older       Passed - Recent (12 mo) or future (30 days) visit within the authorizing provider's specialty    Patient had office visit in the last 12 months or has a visit in the next 30 days with authorizing provider or within the authorizing provider's specialty.  See \"Patient Info\" tab in inbasket, or \"Choose Columns\" in Meds & Orders section of the refill encounter.                "

## 2018-10-24 NOTE — TELEPHONE ENCOUNTER
"    Medication Detail         Disp Refills Start End JANELLE     CHARLY ELLIPTA 100-25 MCG/INH oral inhaler   5/11/2017  Yes     Class: Historical     Need to verify med    Called patient @ [phone] - \"the # you have dialed has been changed or disconnected\"  Called patient @ [phone]    Patient states she is still taking this inhaler (normally gets from [facility] but she cannot get through to anyone today - patient just moved and cannot find any of her inhalers).   Patient verified she inhales 1 puff daily for COPD.     Routing refill request to provider for review/approval because:  Medication is reported/historical        Felisa Medel RN  [facility]    "

## 2018-10-24 NOTE — TELEPHONE ENCOUNTER
Reason for Call:  Medication or medication refill:    Do you use a [facility]?  Name of the pharmacy and phone number for the current request:    [facility] [address] [place], [place] - [address] AT [facility] [address]                                                                                        Name of the medication requested: BREO ELLIPTA 100-25 MCG/INH oral inhaler    Other request: patient is in [place] and needs this drug .    Can we leave a detailed message on this number? YES    Phone number patient can be reached at: Cell number on file:    Telephone Information:   Mobile [phone]       Best Time: anytime    Call taken on 10/24/2018 at 1:55 PM by Carri Chavez

## 2019-06-06 DIAGNOSIS — G25.81 RESTLESS LEG SYNDROME: ICD-10-CM

## 2019-06-06 RX ORDER — GABAPENTIN 300 MG/1
CAPSULE ORAL
Qty: 90 CAPSULE | Refills: 1 | Status: SHIPPED | OUTPATIENT
Start: 2019-06-06

## 2019-06-06 NOTE — TELEPHONE ENCOUNTER
Outpatient Medication Detail      Disp Refills Start End JANELLE   gabapentin (NEURONTIN) 300 MG capsule 90 capsule 1 6/29/2018  No   Sig: TAKE 1 CAPSULE AT BEDTIME   Sent to pharmacy as: gabapentin (NEURONTIN) 300 MG capsule     Last prescribed to MD HORTENSIA     Problem List Complete:  No     PROVIDER TO CONSIDER COMPLETION OF PROBLEM LIST AND OVERVIEW/CONTROLLED SUBSTANCE AGREEMENT    Last Office Visit with [facility] primary care provider: 09/18/2018    Future Office visit:     Controlled substance agreement:   Encounter-Level CSA:    There are no encounter-level csa.     Patient-Level CSA:    There are no patient-level csa.         Last Urine Drug Screen: No results found for: CDAUT, No results found for: COMDAT, No results found for: THC13, PCP13, COC13, MAMP13, OPI13, AMP13, BZO13, TCA13, MTD13, BAR13, OXY13, PPX13, BUP13     Processing:  [facility]     [email or web address]          Routing refill request to provider for review/approval because:  Drug not on the [facility] refill protocol         Felisa Medel RN  [facility]

## 2019-06-23 DIAGNOSIS — I10 ESSENTIAL HYPERTENSION WITH GOAL BLOOD PRESSURE LESS THAN 140/90: ICD-10-CM

## 2019-06-24 NOTE — TELEPHONE ENCOUNTER
"Requested Prescriptions   Pending Prescriptions Disp Refills     losartan-hydrochlorothiazide (HYZAAR) 50-12.5 MG tablet [Pharmacy Med Name: LOSARTAN/HCTZ TABS 50/12.5]          Last Written Prescription Date:  6.29.18  Last Fill Quantity: 90 tablet,  # refills: 3   Last office visit: 9/18/2018 with prescribing provider:  Emanuel Carver MD             Future Office Visit:       90 tablet 3     Sig: TAKE 1 TABLET DAILY       Angiotensin-II Receptors Failed - 6/23/2019 11:35 PM        Failed - Blood pressure under 140/90 in past 12 months     BP Readings from Last 3 Encounters:   09/18/18 142/66   06/07/18 (!) 146/96   06/05/17 134/80                 Failed - Normal serum creatinine on file in past 12 months     Recent Labs   Lab Test 06/07/18  1245  08/12/13  1340   CR 0.72   < >  --    CREAT  --   --  0.8    < > = values in this interval not displayed.             Failed - Normal serum potassium on file in past 12 months     Recent Labs   Lab Test 06/07/18  1245   POTASSIUM 3.9                    Passed - Recent (12 mo) or future (30 days) visit within the authorizing provider's specialty     Patient had office visit in the last 12 months or has a visit in the next 30 days with authorizing provider or within the authorizing provider's specialty.  See \"Patient Info\" tab in inbasket, or \"Choose Columns\" in Meds & Orders section of the refill encounter.              Passed - Medication is active on med list        Passed - Patient is age 18 or older        Passed - No active pregnancy on record        Passed - No positive pregnancy test in past 12 months        "

## 2019-06-26 RX ORDER — LOSARTAN POTASSIUM AND HYDROCHLOROTHIAZIDE 12.5; 5 MG/1; MG/1
TABLET ORAL
Qty: 90 TABLET | Refills: 0 | Status: SHIPPED | OUTPATIENT
Start: 2019-06-26

## 2019-06-26 NOTE — TELEPHONE ENCOUNTER
Routing refill request to provider for review/approval because:  Failed protocol  Tamia Clarke,RN BSN  [facility]  [phone]

## 2019-06-27 NOTE — TELEPHONE ENCOUNTER
Patient states she has moved permanently to [place].  Will be having her physicals with a provider there in the future.  Updated chart.    Mya Lora

## 2019-06-27 NOTE — TELEPHONE ENCOUNTER
Limited rx, due for cpx fasting    BP Readings from Last 3 Encounters:   09/18/18 142/66   06/07/18 (!) 146/96   06/05/17 134/80     Creatinine   Date Value Ref Range Status   06/07/2018 0.72 0.52 - 1.04 mg/dL Final

## 2019-09-22 DIAGNOSIS — I10 ESSENTIAL HYPERTENSION WITH GOAL BLOOD PRESSURE LESS THAN 140/90: ICD-10-CM

## 2019-09-23 NOTE — TELEPHONE ENCOUNTER
"Requested Prescriptions   Pending Prescriptions Disp Refills     losartan-hydrochlorothiazide (HYZAAR) 50-12.5 MG tablet [Pharmacy Med Name: LOSARTAN/HCTZ TABS 50/12.5]        Last Written Prescription Date:  6.26.19  Last Fill Quantity: 90 tablet,  # refills: 0   Last office visit: 9/18/2018 with prescribing provider:  Emanuel Carver MD             Future Office Visit:       90 tablet 4     Sig: TAKE 1 TABLET DAILY       Angiotensin-II Receptors Failed - 9/22/2019 11:55 PM        Failed - Last blood pressure under 140/90 in past 12 months     BP Readings from Last 3 Encounters:   09/18/18 142/66   06/07/18 (!) 146/96   06/05/17 134/80                 Failed - Recent (12 mo) or future (30 days) visit within the authorizing provider's specialty     Patient had office visit in the last 12 months or has a visit in the next 30 days with authorizing provider or within the authorizing provider's specialty.  See \"Patient Info\" tab in inbasket, or \"Choose Columns\" in Meds & Orders section of the refill encounter.              Failed - Normal serum creatinine on file in past 12 months     Recent Labs   Lab Test 06/07/18  1245  08/12/13  1340   CR 0.72   < >  --    CREAT  --   --  0.8    < > = values in this interval not displayed.             Failed - Normal serum potassium on file in past 12 months     Recent Labs   Lab Test 06/07/18  1245   POTASSIUM 3.9                    Passed - Medication is active on med list        Passed - Patient is age 18 or older        Passed - No active pregnancy on record        Passed - No positive pregnancy test in past 12 months        "

## 2019-09-23 NOTE — TELEPHONE ENCOUNTER
Needs an OV for further refills     Attempt # 1    Called #   Telephone Information:   Mobile [phone]       Left a non detailed VM     Elena Purcell RN, BSN  [facility]

## 2019-09-24 RX ORDER — LOSARTAN POTASSIUM AND HYDROCHLOROTHIAZIDE 12.5; 5 MG/1; MG/1
TABLET ORAL
Qty: 90 TABLET | Refills: 4 | OUTPATIENT
Start: 2019-09-24

## 2019-09-24 NOTE — TELEPHONE ENCOUNTER
Called #   Telephone Information:   Mobile [phone]       Pt stated that she moved to [place] and has est care with someone down there     Refill denied     Elena Purcell RN, BSN  [facility]